# Patient Record
Sex: FEMALE | Race: WHITE | Employment: FULL TIME | URBAN - METROPOLITAN AREA
[De-identification: names, ages, dates, MRNs, and addresses within clinical notes are randomized per-mention and may not be internally consistent; named-entity substitution may affect disease eponyms.]

---

## 2017-04-18 ENCOUNTER — OFFICE VISIT (OUTPATIENT)
Dept: ORTHOPEDIC SURGERY | Age: 52
End: 2017-04-18

## 2017-04-18 VITALS — WEIGHT: 145.94 LBS | BODY MASS INDEX: 25.86 KG/M2 | HEIGHT: 63 IN

## 2017-04-18 DIAGNOSIS — M17.12 PRIMARY OSTEOARTHRITIS OF LEFT KNEE: Primary | ICD-10-CM

## 2017-04-18 PROCEDURE — 20611 DRAIN/INJ JOINT/BURSA W/US: CPT | Performed by: PHYSICIAN ASSISTANT

## 2017-04-18 PROCEDURE — 99213 OFFICE O/P EST LOW 20 MIN: CPT | Performed by: PHYSICIAN ASSISTANT

## 2017-04-19 ENCOUNTER — TELEPHONE (OUTPATIENT)
Dept: ORTHOPEDIC SURGERY | Age: 52
End: 2017-04-19

## 2017-04-24 ENCOUNTER — HOSPITAL ENCOUNTER (OUTPATIENT)
Dept: SURGERY | Age: 52
Discharge: HOME | End: 2017-04-24
Attending: INTERNAL MEDICINE | Admitting: INTERNAL MEDICINE

## 2017-04-24 VITALS
HEIGHT: 63 IN | SYSTOLIC BLOOD PRESSURE: 116 MMHG | BODY MASS INDEX: 28 KG/M2 | TEMPERATURE: 97.7 F | DIASTOLIC BLOOD PRESSURE: 76 MMHG | OXYGEN SATURATION: 100 % | WEIGHT: 158 LBS | HEART RATE: 62 BPM | RESPIRATION RATE: 16 BRPM

## 2017-04-24 LAB
HBV SURFACE AB TITR SER: <3.5 MUL/ML
HEPATITIS B SURFACE ANTIGEN INTERPRETATION: NORMAL
HEPATITIS C ANTIBODY INTERPRETATION: NORMAL

## 2017-04-24 RX ORDER — ASCORBIC ACID 500 MG
500 TABLET ORAL DAILY
COMMUNITY
End: 2019-08-20

## 2017-04-24 RX ORDER — LIDOCAINE HYDROCHLORIDE 10 MG/ML
0.1 INJECTION, SOLUTION EPIDURAL; INFILTRATION; INTRACAUDAL; PERINEURAL ONCE
Status: DISCONTINUED | OUTPATIENT
Start: 2017-04-24 | End: 2017-04-25 | Stop reason: HOSPADM

## 2017-04-24 RX ORDER — MAGNESIUM 30 MG
250 TABLET ORAL DAILY
COMMUNITY

## 2017-04-24 RX ORDER — NICOTINE POLACRILEX 2 MG
10000 GUM BUCCAL DAILY
COMMUNITY
End: 2019-09-03

## 2017-04-24 RX ORDER — ESTRADIOL 0.5 MG/1
0.5 TABLET ORAL DAILY
COMMUNITY
End: 2019-08-20

## 2017-04-24 RX ORDER — SODIUM CHLORIDE, SODIUM LACTATE, POTASSIUM CHLORIDE, CALCIUM CHLORIDE 600; 310; 30; 20 MG/100ML; MG/100ML; MG/100ML; MG/100ML
INJECTION, SOLUTION INTRAVENOUS CONTINUOUS
Status: DISCONTINUED | OUTPATIENT
Start: 2017-04-24 | End: 2017-04-25 | Stop reason: HOSPADM

## 2017-04-24 RX ADMIN — SODIUM CHLORIDE, SODIUM LACTATE, POTASSIUM CHLORIDE, CALCIUM CHLORIDE: 600; 310; 30; 20 INJECTION, SOLUTION INTRAVENOUS at 07:22

## 2017-04-24 ASSESSMENT — PAIN - FUNCTIONAL ASSESSMENT: PAIN_FUNCTIONAL_ASSESSMENT: 0-10

## 2017-04-24 ASSESSMENT — PAIN SCALES - GENERAL
PAINLEVEL_OUTOF10: 0

## 2017-04-25 LAB — HEPATITIS B CORE TOTAL ANTIBODY: NEGATIVE

## 2017-04-27 LAB
C282Y HEMOCHROMATOSIS MUT: NEGATIVE
H63D HEMOCHROMATOSIS MUT: NEGATIVE
HEMOCHROMATOSIS GENE ANALYSIS: NORMAL
HFE PCR SPECIMEN: NORMAL
S65C HEMOCHROMATOSIS MUT: NEGATIVE

## 2017-09-21 ENCOUNTER — TELEPHONE (OUTPATIENT)
Dept: ORTHOPEDIC SURGERY | Age: 52
End: 2017-09-21

## 2017-09-21 ENCOUNTER — HOSPITAL ENCOUNTER (OUTPATIENT)
Dept: MAMMOGRAPHY | Age: 52
Discharge: HOME | End: 2017-09-21
Attending: OBSTETRICS & GYNECOLOGY | Admitting: OBSTETRICS & GYNECOLOGY

## 2017-09-21 DIAGNOSIS — Z12.31 ENCOUNTER FOR SCREENING MAMMOGRAM FOR BREAST CANCER: ICD-10-CM

## 2018-09-24 ENCOUNTER — HOSPITAL ENCOUNTER (OUTPATIENT)
Dept: WOMENS IMAGING | Age: 53
Discharge: HOME | End: 2018-09-24
Payer: COMMERCIAL

## 2018-09-24 DIAGNOSIS — Z12.31 ENCOUNTER FOR SCREENING MAMMOGRAM FOR MALIGNANT NEOPLASM OF BREAST: ICD-10-CM

## 2018-09-24 PROCEDURE — 77067 SCR MAMMO BI INCL CAD: CPT

## 2018-10-02 ENCOUNTER — HOSPITAL ENCOUNTER (OUTPATIENT)
Dept: WOMENS IMAGING | Age: 53
Discharge: HOME | End: 2018-10-02
Payer: COMMERCIAL

## 2018-10-02 DIAGNOSIS — R92.8 ABNORMAL MAMMOGRAM: ICD-10-CM

## 2018-10-02 PROCEDURE — 76642 ULTRASOUND BREAST LIMITED: CPT

## 2018-10-02 PROCEDURE — G0279 TOMOSYNTHESIS, MAMMO: HCPCS

## 2019-04-04 ENCOUNTER — HOSPITAL ENCOUNTER (OUTPATIENT)
Dept: WOMENS IMAGING | Age: 54
Discharge: HOME | End: 2019-04-04
Payer: COMMERCIAL

## 2019-04-04 DIAGNOSIS — R92.8 ABNORMAL MAMMOGRAM: ICD-10-CM

## 2019-04-04 PROCEDURE — G0279 TOMOSYNTHESIS, MAMMO: HCPCS

## 2019-04-04 PROCEDURE — 76642 ULTRASOUND BREAST LIMITED: CPT

## 2019-07-26 ENCOUNTER — OFFICE VISIT (OUTPATIENT)
Dept: ORTHOPEDIC SURGERY | Age: 54
End: 2019-07-26
Payer: COMMERCIAL

## 2019-07-26 VITALS
BODY MASS INDEX: 31.89 KG/M2 | DIASTOLIC BLOOD PRESSURE: 77 MMHG | HEART RATE: 72 BPM | SYSTOLIC BLOOD PRESSURE: 111 MMHG | WEIGHT: 180 LBS | HEIGHT: 63 IN

## 2019-07-26 DIAGNOSIS — M54.2 NECK PAIN: Primary | ICD-10-CM

## 2019-07-26 PROCEDURE — 96372 THER/PROPH/DIAG INJ SC/IM: CPT | Performed by: PHYSICAL MEDICINE & REHABILITATION

## 2019-07-26 PROCEDURE — 99203 OFFICE O/P NEW LOW 30 MIN: CPT | Performed by: PHYSICAL MEDICINE & REHABILITATION

## 2019-07-26 RX ORDER — PREDNISONE 10 MG/1
TABLET ORAL
Qty: 26 TABLET | Refills: 0 | Status: SHIPPED | OUTPATIENT
Start: 2019-07-26 | End: 2019-08-20

## 2019-07-26 RX ORDER — KETOROLAC TROMETHAMINE 10 MG/1
10 TABLET, FILM COATED ORAL EVERY 6 HOURS PRN
Qty: 20 TABLET | Refills: 0 | Status: SHIPPED | OUTPATIENT
Start: 2019-07-26 | End: 2019-08-20

## 2019-08-19 PROBLEM — F41.9 ANXIETY: Status: ACTIVE | Noted: 2018-11-12

## 2019-08-19 PROBLEM — M19.041 ARTHRITIS OF FINGER OF BOTH HANDS: Status: ACTIVE | Noted: 2018-04-16

## 2019-08-19 PROBLEM — K57.30 COLON, DIVERTICULOSIS: Status: ACTIVE | Noted: 2018-02-24

## 2019-08-19 PROBLEM — I10 ESSENTIAL HYPERTENSION: Status: ACTIVE | Noted: 2017-12-05

## 2019-08-19 PROBLEM — M19.042 ARTHRITIS OF FINGER OF BOTH HANDS: Status: ACTIVE | Noted: 2018-04-16

## 2019-08-19 PROBLEM — E04.1 THYROID NODULE: Status: ACTIVE | Noted: 2017-01-30

## 2019-08-20 ENCOUNTER — OFFICE VISIT (OUTPATIENT)
Dept: ENDOCRINOLOGY | Age: 54
End: 2019-08-20
Payer: COMMERCIAL

## 2019-08-20 VITALS
DIASTOLIC BLOOD PRESSURE: 79 MMHG | OXYGEN SATURATION: 98 % | WEIGHT: 186 LBS | HEART RATE: 80 BPM | SYSTOLIC BLOOD PRESSURE: 133 MMHG | BODY MASS INDEX: 32.96 KG/M2 | HEIGHT: 63 IN

## 2019-08-20 DIAGNOSIS — Z83.3 FAMILY HISTORY OF DIABETES MELLITUS: ICD-10-CM

## 2019-08-20 DIAGNOSIS — E16.2 HYPOGLYCEMIA: ICD-10-CM

## 2019-08-20 DIAGNOSIS — E66.09 NON MORBID OBESITY DUE TO EXCESS CALORIES: ICD-10-CM

## 2019-08-20 DIAGNOSIS — R68.89 EXCESSIVE BIOTIN INTAKE: ICD-10-CM

## 2019-08-20 PROBLEM — E67.8 EXCESSIVE BIOTIN INTAKE: Status: ACTIVE | Noted: 2019-08-20

## 2019-08-20 PROCEDURE — 99244 OFF/OP CNSLTJ NEW/EST MOD 40: CPT | Performed by: INTERNAL MEDICINE

## 2019-08-20 RX ORDER — GINSENG 100 MG
CAPSULE ORAL
COMMUNITY

## 2019-08-20 RX ORDER — LISINOPRIL 5 MG/1
TABLET ORAL DAILY
COMMUNITY

## 2019-08-20 RX ORDER — ESTRADIOL 2 MG/1
2 TABLET ORAL DAILY
COMMUNITY

## 2019-08-20 RX ORDER — DULOXETIN HYDROCHLORIDE 30 MG/1
CAPSULE, DELAYED RELEASE ORAL
COMMUNITY
Start: 2019-05-09

## 2019-08-22 DIAGNOSIS — E16.2 HYPOGLYCEMIA: ICD-10-CM

## 2019-08-22 DIAGNOSIS — R68.89 EXCESSIVE BIOTIN INTAKE: ICD-10-CM

## 2019-08-22 LAB
GLUCOSE BLD-MCNC: 71 MG/DL (ref 70–99)
T3 FREE: 2.4 PG/ML (ref 2.3–4.2)
T4 FREE: 1.2 NG/DL (ref 0.9–1.8)

## 2019-08-24 LAB — C-PEPTIDE: 1.4 NG/ML (ref 1.1–4.4)

## 2019-09-03 ENCOUNTER — OFFICE VISIT (OUTPATIENT)
Dept: ENDOCRINOLOGY | Age: 54
End: 2019-09-03
Payer: COMMERCIAL

## 2019-09-03 VITALS
HEART RATE: 78 BPM | OXYGEN SATURATION: 96 % | HEIGHT: 63 IN | WEIGHT: 189 LBS | SYSTOLIC BLOOD PRESSURE: 105 MMHG | BODY MASS INDEX: 33.49 KG/M2 | DIASTOLIC BLOOD PRESSURE: 72 MMHG

## 2019-09-03 DIAGNOSIS — E16.2 HYPOGLYCEMIA: Primary | ICD-10-CM

## 2019-09-03 PROCEDURE — 99212 OFFICE O/P EST SF 10 MIN: CPT | Performed by: INTERNAL MEDICINE

## 2019-09-03 PROCEDURE — 97802 MEDICAL NUTRITION INDIV IN: CPT | Performed by: DIETITIAN, REGISTERED

## 2019-09-03 PROCEDURE — 95251 CONT GLUC MNTR ANALYSIS I&R: CPT | Performed by: INTERNAL MEDICINE

## 2019-10-14 ENCOUNTER — HOSPITAL ENCOUNTER (OUTPATIENT)
Dept: MAMMOGRAPHY | Age: 54
Discharge: HOME | End: 2019-10-14
Payer: COMMERCIAL

## 2019-10-14 DIAGNOSIS — Z12.39 BREAST CANCER SCREENING: ICD-10-CM

## 2019-10-14 PROCEDURE — 77063 BREAST TOMOSYNTHESIS BI: CPT

## 2020-10-15 ENCOUNTER — HOSPITAL ENCOUNTER (OUTPATIENT)
Dept: MAMMOGRAPHY | Age: 55
Discharge: HOME | End: 2020-10-15
Payer: COMMERCIAL

## 2020-10-15 PROCEDURE — 77063 BREAST TOMOSYNTHESIS BI: CPT

## 2020-10-23 ENCOUNTER — HOSPITAL ENCOUNTER (OUTPATIENT)
Dept: WOMENS IMAGING | Age: 55
Discharge: HOME | End: 2020-10-23
Payer: COMMERCIAL

## 2020-10-23 PROCEDURE — G0279 TOMOSYNTHESIS, MAMMO: HCPCS

## 2020-10-23 PROCEDURE — 76642 ULTRASOUND BREAST LIMITED: CPT

## 2021-03-17 ENCOUNTER — IMMUNIZATION (OUTPATIENT)
Dept: PRIMARY CARE CLINIC | Age: 56
End: 2021-03-17
Payer: COMMERCIAL

## 2021-03-17 PROCEDURE — 0011A COVID-19, MODERNA VACCINE 100MCG/0.5ML DOSE: CPT | Performed by: FAMILY MEDICINE

## 2021-03-17 PROCEDURE — 91301 COVID-19, MODERNA VACCINE 100MCG/0.5ML DOSE: CPT | Performed by: FAMILY MEDICINE

## 2021-04-14 ENCOUNTER — IMMUNIZATION (OUTPATIENT)
Dept: PRIMARY CARE CLINIC | Age: 56
End: 2021-04-14
Payer: COMMERCIAL

## 2021-04-14 PROCEDURE — 91301 COVID-19, MODERNA VACCINE 100MCG/0.5ML DOSE: CPT | Performed by: FAMILY MEDICINE

## 2021-04-14 PROCEDURE — 0012A COVID-19, MODERNA VACCINE 100MCG/0.5ML DOSE: CPT | Performed by: FAMILY MEDICINE

## 2021-10-06 NOTE — PROGRESS NOTES
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    initially treated w phlebotomy then with anti-malarials    Reflux     Thyroid nodule          Past Surgical History:   Procedure Laterality Date    CARPAL TUNNEL RELEASE      right    CERVICAL FUSION      C6-C7    COLONOSCOPY  04/24/2017    polyp, diverticulosis    CYST REMOVAL      right wrist    FINE NEEDLE ASPIRATION  02/16/2015    Thyroid nodule    HYSTERECTOMY  2001    partial    KNEE ARTHROSCOPY Left 3/15/16    partial medial menisectomy    KNEE SURGERY      right 4/2011 X 2    OVARY REMOVAL      bilateral    SHOULDER ARTHROSCOPY Left 10/27/15    SHOULDER SURGERY      right         Allergies   Allergen Reactions    Greene-2 Inhibitors Shortness Of Breath     Wheezing/SOB    Cephalosporins Hives     Unknown reaction    Other      Greene II inhibitors cause shortness of breath         Current Outpatient Medications:     estradiol (ESTRACE) 2 MG tablet, Take 2 mg by mouth daily, Disp: , Rfl:     lisinopril (PRINIVIL;ZESTRIL) 5 MG tablet, Take by mouth daily, Disp: , Rfl:     CHOLECALCIFEROL PO, Take 50 mcg by mouth daily, Disp: , Rfl:     DULoxetine (CYMBALTA) 30 MG extended release capsule, TAKE 1 CAPSULE EVERY DAY, Disp: , Rfl:     Coenzyme Q10 (CO Q10) 30 MG CAPS, Take by mouth, Disp: , Rfl:     Famotidine (PEPCID PO), Take by mouth, Disp: , Rfl:     Biotin 1 MG CAPS, Take 10,000 mcg by mouth daily , Disp: , Rfl:     magnesium 30 MG tablet, Take 250 mg by mouth daily , Disp: , Rfl:     ibuprofen (ADVIL;MOTRIN) 600 MG tablet, Take 1 tablet by mouth every 6 hours as needed for Pain, Disp: 40 tablet, Rfl: 1    Omega-3 Fatty Acids (FISH OIL PO), Take 1 tablet by mouth nightly, Disp: , Rfl:     hydroxychloroquine (PLAQUENIL) 200 MG tablet, Take 200 mg by mouth Twice a Week , Disp: , Rfl:     cetirizine (ZYRTEC ALLERGY) 10 MG tablet, Take 10 mg by mouth daily, Disp: , Rfl:     diphenhydrAMINE (BENADRYL) 25
no

## 2021-10-26 ENCOUNTER — HOSPITAL ENCOUNTER (OUTPATIENT)
Dept: WOMENS IMAGING | Age: 56
Discharge: HOME | End: 2021-10-26
Payer: COMMERCIAL

## 2021-10-26 DIAGNOSIS — Z12.31 SCREENING MAMMOGRAM, ENCOUNTER FOR: ICD-10-CM

## 2021-10-26 PROCEDURE — 77063 BREAST TOMOSYNTHESIS BI: CPT

## 2022-10-26 ENCOUNTER — HOSPITAL ENCOUNTER (OUTPATIENT)
Dept: WOMENS IMAGING | Age: 57
Discharge: HOME | End: 2022-10-26
Payer: COMMERCIAL

## 2022-10-26 DIAGNOSIS — Z12.31 ENCOUNTER FOR SCREENING MAMMOGRAM FOR BREAST CANCER: ICD-10-CM

## 2022-10-26 PROCEDURE — 77067 SCR MAMMO BI INCL CAD: CPT

## 2022-10-31 ENCOUNTER — HOSPITAL ENCOUNTER (OUTPATIENT)
Dept: WOMENS IMAGING | Age: 57
Discharge: HOME | End: 2022-10-31
Payer: COMMERCIAL

## 2022-10-31 DIAGNOSIS — R92.8 ABNORMAL MAMMOGRAM: ICD-10-CM

## 2022-10-31 PROCEDURE — 77065 DX MAMMO INCL CAD UNI: CPT

## 2022-10-31 NOTE — PROGRESS NOTES
Last office visit:  3/2022 with Dr Louie  Upcoming Office Visit.  9/2022 with new PCP  Last lab work.  3/2022     [address]  [address] [place] [address], [address] [place] [address] [address]   Phone: [phone]          NAME:  Samantha Louise  YOB: 1965  MEDICAL RECORD NUMBER:  [id number]  TODAY'S DATE:  10/31/2022      Referring Physician: Dr. Harrison Segovia    Procedure: Stereotactic Bx    Right breast, two sites    Date of biopsy: 11/4/22    Patient taking blood thinners: no    Medicine allergies: yes - see chart    Special Instructions: prepped prone. Reviewed pre and post biopsy instructions/information with patient. Pt verbalized understanding. Biopsy order form faxed to referring MD.      Stereotactic Biopsy Education     What is it? Stereotactic breast biopsy is a test that uses imaging, such as  X-ray, to find an area of your breast where a tissue sample will be taken. The sample is viewed  under a microscope to check for signs of breast cancer. Why is this test done? This type of breast biopsy is usually done to check for cancer in a lump or microcalcifications found during a mammogram.     How can you prepare for the test?    You may take your regular medications as prescribed. You may eat and drink before the test.  Take a shower the evening or morning before the biopsy. What happens before the test?   Mammogram images are taken to find the exact site for the biopsy. Your skin is washed with an alcohol prep. You will be given an injection of medication to numb your breast.     What happens during the test?  When your breast is numb, a small cut is made in the skin. Using the imaging, the doctor will guide the needle into the biopsy area. A sample of breast tissue is taken through the needle. A small clip is inserted into your breast to stephenie the biopsy site. The needle is removed and pressure put on the needle site to stop any bleeding. Steri Strips and a bandage will be placed over the site. How long does the test take?  About 60 minutes. Most of the time is spent preparing for the images and finding the area for the biopsy. What are the risks? Bleeding: You may have some bleeding which can cause bruising, swelling, or hematoma. Infection: Signs of infection are redness, swelling, heat, or increasing pain at site. Sample is not adequate: This would require repeating the biopsy. What happens after the test?  The nurse will review your post biopsy instructions which include:        Placing an ice pack in your bra. Wearing a firm fitting bra. You may use Tylenol (Acetaminiphen) for discomfort. Lab results take about 2-3 business days. The Breast Navigator or your doctor will call you with the results. Pre Stereotactic Breast Biopsy:     (Please arrive 15 minutes early)                                                 [x] Blood thinner history reviewed with patient    [x] Take all your other medications on your normal routine schedule. [x] You may eat and drink as normal before your biopsy. [x] You may drive yourself. [x] Take a shower the night before or the morning of the biopsy. [x] No heavy lifting or exercise for 48 hours after the biopsy. [x] Printed Pre Stereotactic Biopsy Instructions were provided, reviewed with the patient and all questions were answered. Gonzalo Post RN  10/31/2022  9:02 AM      The [facility] Information:   Should you experience any significant changes in your health or have questions about your care, please contact:  Gonzalo Post, Breast Nurse Navigator with [facility] [place]  [phone]  Monday-Friday. If you need help with your care outside these hours and cannot wait until we are again available, contact your Physician or go to the hospital emergency room.

## 2022-11-04 ENCOUNTER — HOSPITAL ENCOUNTER (OUTPATIENT)
Dept: WOMENS IMAGING | Age: 57
Discharge: HOME | End: 2022-11-04
Payer: COMMERCIAL

## 2022-11-04 DIAGNOSIS — R92.8 ABNORMAL MAMMOGRAM: ICD-10-CM

## 2022-11-04 PROCEDURE — 19082 BX BREAST ADD LESION STRTCTC: CPT

## 2022-11-04 PROCEDURE — 88342 IMHCHEM/IMCYTCHM 1ST ANTB: CPT

## 2022-11-04 PROCEDURE — 76098 X-RAY EXAM SURGICAL SPECIMEN: CPT

## 2022-11-04 PROCEDURE — 2720000010 MAM STEREO BREAST BX W LOC DEVICE 1ST LESION RIGHT

## 2022-11-04 PROCEDURE — 77065 DX MAMMO INCL CAD UNI: CPT

## 2022-11-04 PROCEDURE — 88305 TISSUE EXAM BY PATHOLOGIST: CPT

## 2022-11-04 NOTE — PROGRESS NOTES
Patient in [address] [place] [address] for breast biopsy. Radiologist reviewed procedure with patient, consent signed. Patient tolerated procedure well. Compression held. Site cleansed with chloraprep, steri strips and dry dressing applied. Ice pack provided. Reviewed discharge instructions with patient. Patient verbalized understanding and agreed to contact the Breast Navigator with any questions. Patient was A&Ox3 and steady on feet and discharged to waiting area. The [address]   Discharge Instructions  [facility]  [address]  [address]  Telephone: [phone]    NAME:  Chapin Bartlett  YOB: 1965  GENDER: female  MEDICAL RECORD NUMBER:  [id number]  TODAY'S DATE:  11/4/2022    Discharge Instructions Breast Center:    Post Breast Biopsy Instructions     [x] You may remove your outer dressing in 24 hours and you may shower. \"Steri-strips\" tape will stay on for 5-7 days. [x] Place a cold pack inside your bra on top of the dressing for at least 3 hours, removing it every 15 minutes for 15 minutes after your biopsy. [x] Wear a firm fitting bra for at least 24 hours after your biopsy, including while you sleep. [x] Place an ice pack inside your bra on top of the dressing for at least 3 hours, removing it every 15 minutes for 15 minutes after your biopsy. [x] You may resume your held medications tomorrow, unless otherwise directed by your physician. [x] Your physician has instructed you to take Tylenol (Acetaminophen) the day of your biopsy for any discomfort. [x] Watch for excessive bleeding. If bleeding occurs apply pressure to site. Watch for signs of infection, increased pain, redness, swelling and heat. If this occurs call your physician. [x] Do not participate in any strenuous exercise for 48 hours after your biopsy and do not lift, pull or push anything over 5-10 lbs.       [x] Results will be available in 2-3 working days.  Your referring physician or the Nurse Navigator will call you with results. The [facility] Information:   Should you experience any significant changes in your health or have questions about your care, please contact:  Jean-Paul Sy Nurse Breast Navigator with [facility]  [phone]  Monday-Friday. If you need help with your care outside these hours and cannot wait until we are again available, contact your Physician or go to the hospital emergency room.         Electronically signed by Jean-Paul Sy RN on 11/4/2022 at 1:49 PM

## 2022-11-08 ENCOUNTER — TELEPHONE (OUTPATIENT)
Dept: WOMENS IMAGING | Age: 57
End: 2022-11-08

## 2022-11-08 NOTE — TELEPHONE ENCOUNTER
Pathology for breast biopsy complete, radiologist confirms concordance. Reviewed breast biopsy results with patient and answered all questions. The radiologist is recommending that the patient see a breast surgeon regarding biopsy results. Per patient request, referral made to [facility]. Path report faxed to Dr. Laurel Barthel. Breast Navigator will remain available for any questions. Pt verbalized understanding.       Cale Mac RN

## 2022-11-14 ENCOUNTER — TELEPHONE (OUTPATIENT)
Dept: BREAST CENTER | Age: 57
End: 2022-11-14

## 2022-11-14 NOTE — TELEPHONE ENCOUNTER
Patient called & asked if there was anyway to speak withTammi please, said she needed to either speak to her or have a call returned? She is needing estimated times of when she thinks her surgery might be so she can let work know. Jessica Rivas has taken car of this.

## 2022-11-15 NOTE — TELEPHONE ENCOUNTER
I spoke to patient. I'm unable to give her date until she meets with . I did give her an estimate of dates.        Thanks, Ananda Younger

## 2022-11-21 ASSESSMENT — ENCOUNTER SYMPTOMS
GASTROINTESTINAL NEGATIVE: 1
RESPIRATORY NEGATIVE: 1
EYES NEGATIVE: 1

## 2022-11-21 NOTE — H&P (VIEW-ONLY)
CC: Right breast atypical ductal hyperplasia    HPI: Ms. Jarrett Zheng is a 65 y.o. woman who presents today with new atypical ductal hyperplasia of the right breast. She has not noticed any new abnormalities such as masses, skin changes, color changes,nipple discharge, or changes to the nipple-areolar complex. She has no family history of breast or ovarian cancer. She presents to the surgical office today in initial consultation to discuss her recent breast FF radiology. INTERVAL HISTORY:  On 10/31/2022 she was recalled from screening mammogram for right breast calcifications. The left breast was negative. Diagnostic imaging identified 2 subcentimeter groups of calcifications in the 9:00 and 12 o'clock position. Both recommended for biopsy. BI-RADS 4. On 2022 she underwent 3 site core needle biopsy of the right breast.  Pathology was considered concordant. Pathology of the right breast, 9 o'clock position identified atypical ductal hyperplasia and PASH. There is no sign of malignancy. An open coil marker was placed. This is located approximately 7 mm inferior to the calcifications that remain. The following 2 biopsies were taken from the same location and marked by a butterfly clip. Pathology of the right breast 12:00 calcifications identified benign findings with no sign of malignancy. Pathology of the right breast 12:00 identified atypical ductal hyperplasia with no sign of malignancy. PATHOLOGY:    Department of Pathology   FINAL SURGICAL PATHOLOGY REPORT   Patient Name:  Lashawn Paul          Accession No:  [id number]    Age Sex:   1965   65 Y / F       Location:      [facility]   Account No:    [de-identified]                 Collected:     2022   Med Rec No:    [id number]                Received:      2022   Attend Phys:   Jairon Hernandez MD        Completed:     2022   Perform Phys:  Christine Katz MD             FINAL DIAGNOSIS:     A.   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 - Negative for malignancy. B.  Right breast calcifications at 12 o'clock, biopsies:   - Benign breast tissue with columnar cell hyperplasia and calcifications. - Negative for malignancy. C.  Right breast at 12 o'clock, biopsies:   - Atypical ductal hyperplasia, columnar cell hyperplasia, usual ductal   hyperplasia, increase stromal fibrosis, and calcifications. - Negative for malignancy. BUCCA/BUCCA         Preoperative Diagnosis: A) Biopsy, right breast calcifications at 9   o'clock B) Biopsy, right breast calcifications at 12 o'clock C) Biopsy,   right breast at 12 o'clock   Postoperative Diagnosis: A) Biopsy, right breast calcifications at 9   o'clock B) Biopsy, right breast calcifications at 12 o'clock C) Biopsy,   right breast at 12 o'clock       SPECIMEN:   A.  BREAST, BIOPSY, RIGHT, FORMALIN TIME 1300   B. BREAST, BIOPSY, RIGHT, FORMALIN TIME 1326   C. BREAST, BIOPSY, RIGHT, FORMALIN TIME 1340     GROSS DESCRIPTION:   A: Received in formalin on a radiology grid labeled with the patient's   name and \"right breast calcifications at 9 o'clock\" are sanderson yellow   fragments of adipose tissue and breast tissue, which range in size from 2   to 8 mm. The specimen is entirely submitted in four cassettes, labeled A1   - A4. The specimen was placed in formalin at 1300 on 11/4/2022 (greater   than 6 and less than 72 hours of formalin fixation prior to tissue   processing). The cold ischemic time was 10 minutes. B: Received in formalin on a radiology grid labeled with the patient's   name and \"right breast calcifications at 12 o'clock\" are fragments of   sanderson yellow adipose tissue and breast tissue, which range in size from   1 to 3 mm.  The specimen is entirely submitted in three cassettes, labeled   B1 - B3. The specimen was placed in formalin at 1326 on 11/4/2022   (greater than 6 and less than 72 hours of formalin fixation prior to   tissue processing). The cold ischemic time was 4 minutes. C: Received in formalin in a container labeled with the patient's name   and \"right breast at 12 o'clock\" are fragments of sanderson yellow adipose   tissue and breast tissue, which range in size from 2 mm to 8 mm. The   specimen is entirely submitted in five cassettes, labeled C1 - C5. The   specimen was placed in formalin at 1340 (greater than 6 and less than 72   hours of formalin fixation prior to tissue processing). The cold   ischemic time was 5 minutes. BUCCA/BUCCA     MICROSCOPIC DESCRIPTION: Microscopic examination performed. An   immunoperoxidase stain for CK5/6 was performed on specimens A and C for   the purpose of ruling out atypia. The stain is absent in foci of   atypical epithelium, consistent with atypical ductal hyperplasia. The immunohistochemical (IHC) stain was performed using a single antibody   on a separate tissue section. No multiple antibody cocktails were used   unless specifically documented. An appropriate positive control was   performed with the antibody and the results were reviewed. The control   stain showed the expected positive result within technically acceptable   parameters. CPT: 59142 X3   34790 X2   Copy to: Josie Serrano MD   Case signed out at [facility], [facility] ,   [place], [facility]   Technical processing at [address] [address]  Phone [phone]         Emily Mcmullen M.D., M.S.   (Electronic Signature)   11/08/2022         Review of Systems   Constitutional: Negative. HENT: Negative. Eyes: Negative. Respiratory: Negative. Cardiovascular: Negative. Gastrointestinal: Negative. Genitourinary: Negative. history. Hormonal History:   a.0  m.0  Menarche at age 16. First live birth at age 17. did breastfeed. Postmenopausal at age 40  Hysterectomy: at age 40  Denies estrogen supplementation currently but used estradiol patch until age 49  OCP taken in the past but not currently    Medications:  Medication documentation has been reviewed in the electronic medical record and patient office intake form. Exam:  There were no vitals taken for this visit. Physical Exam      Constitutional: She appears well-nourished. No apparent distress. Breast: The patient was examined in the upright and supine position. She has a \"D\" cup breast. Breasts are symmetrically ptotic. Right: There were no discrete masses or changes in breast contour. There were no skin changes of the breast or nipple areolar complex. There was no nipple inversion or discharge. Left: There were no discrete masses or changes in breast contour. There were no skin changes of the breast or nipple areolar complex. There was no nipple inversion or discharge. There is no axillary lymphadenopathy palpated bilaterally. Head: Normocephalic andatraumatic. Eyes: EOM are normal. Pupils are equal, round, and reactive to light. Neck: Neck supple. No tracheal deviation present. Cardiovascular: regular rate. Extremities appear well perfused. Pulmonary: respirationsare non-labored and without audible distress  Lymphatics: no palpable axillary or cervical lymphadenopathy. Skin: No rash noted. No erythema. Neurologic: alert and oriented. Assessment/Plan: Ms. Erna Gauthier is a 65y.o. year-old woman with right breast atypical ductal hyperplasia      I reviewed with Ms. Erna Gauthier her most recent breast imaging, physical exam findings and biopsy results. We discussed the pathophysiology of her biopsy. We discussed the relative risk of a future cancer in comparison to the general population.   The upgrade rates very better generally excepted to be >20% (ASBrS, 2016). Due to this upgrade risk of invasive cancer or noninvasive cancer in the area surrounding her biopsy cavity, we discussed excisional breast biopsy in the operating room. We discussed the technique of localization. This will be done by targeting the lesion, near which the clip was placed post biopsy. This is performed using either mammographic or ultrasound guidance. We would then proceed to the operating room to remove the abnormal area within the breast. We discussed the risks and benefits of surgery which include but are not limited to bleeding, infection, wound complications, unappealing cosmetics, and the need to return to the operating room for a second procedure based on final pathology. We discussed assessing her breast cancer risk. We performed a risk assessment to evaluate her risk compared to the general population. Her lifetime risk for breast cancer is 46.5% (general population average 10%). If she ultimately is not upgraded we will discuss management based on her personal risk. At this time: We will plan for 2 site right excisional breast biopsy of the 9 to 12 o'clock position marked by the open coil and butterfly marker. All of the patient's questions were answered at this time however, she was encouraged to call the office with any further inquiries. Approximately 30 minutes of time were spent in preparation, direct patient contact, record review, imaging interpretation, care coordination, documentation and activities otherwise related to this encounter.

## 2022-11-21 NOTE — PROGRESS NOTES
CC: Right breast atypical ductal hyperplasia    HPI: Ms. Ashley Kinney is a 65 y.o. woman who presents today with new atypical ductal hyperplasia of the right breast. She has not noticed any new abnormalities such as masses, skin changes, color changes,nipple discharge, or changes to the nipple-areolar complex. She has no family history of breast or ovarian cancer. She presents to the surgical office today in initial consultation to discuss her recent breast FF radiology. INTERVAL HISTORY:  On 10/31/2022 she was recalled from screening mammogram for right breast calcifications. The left breast was negative. Diagnostic imaging identified 2 subcentimeter groups of calcifications in the 9:00 and 12 o'clock position. Both recommended for biopsy. BI-RADS 4. On 2022 she underwent 3 site core needle biopsy of the right breast.  Pathology was considered concordant. Pathology of the right breast, 9 o'clock position identified atypical ductal hyperplasia and PASH. There is no sign of malignancy. An open coil marker was placed. This is located approximately 7 mm inferior to the calcifications that remain. The following 2 biopsies were taken from the same location and marked by a butterfly clip. Pathology of the right breast 12:00 calcifications identified benign findings with no sign of malignancy. Pathology of the right breast 12:00 identified atypical ductal hyperplasia with no sign of malignancy. PATHOLOGY:    Department of Pathology   FINAL SURGICAL PATHOLOGY REPORT   Patient Name:  Sravanthi Landry          Accession No:  [id number]    Age Sex:   1965   65 Y / F       Location:      [facility]   Account No:    [de-identified]                 Collected:     2022   Med Rec No:    [id number]                Received:      2022   Attend Phys:   Bucky Quinn MD        Completed:     2022   Perform Phys:  Brian Osuna MD             FINAL DIAGNOSIS:     A.   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 - Negative for malignancy. B.  Right breast calcifications at 12 o'clock, biopsies:   - Benign breast tissue with columnar cell hyperplasia and calcifications. - Negative for malignancy. C.  Right breast at 12 o'clock, biopsies:   - Atypical ductal hyperplasia, columnar cell hyperplasia, usual ductal   hyperplasia, increase stromal fibrosis, and calcifications. - Negative for malignancy. BUCCA/BUCCA         Preoperative Diagnosis: A) Biopsy, right breast calcifications at 9   o'clock B) Biopsy, right breast calcifications at 12 o'clock C) Biopsy,   right breast at 12 o'clock   Postoperative Diagnosis: A) Biopsy, right breast calcifications at 9   o'clock B) Biopsy, right breast calcifications at 12 o'clock C) Biopsy,   right breast at 12 o'clock       SPECIMEN:   A.  BREAST, BIOPSY, RIGHT, FORMALIN TIME 1300   B. BREAST, BIOPSY, RIGHT, FORMALIN TIME 1326   C. BREAST, BIOPSY, RIGHT, FORMALIN TIME 1340     GROSS DESCRIPTION:   A: Received in formalin on a radiology grid labeled with the patient's   name and \"right breast calcifications at 9 o'clock\" are sanderson yellow   fragments of adipose tissue and breast tissue, which range in size from 2   to 8 mm. The specimen is entirely submitted in four cassettes, labeled A1   - A4. The specimen was placed in formalin at 1300 on 11/4/2022 (greater   than 6 and less than 72 hours of formalin fixation prior to tissue   processing). The cold ischemic time was 10 minutes. B: Received in formalin on a radiology grid labeled with the patient's   name and \"right breast calcifications at 12 o'clock\" are fragments of   sanderson yellow adipose tissue and breast tissue, which range in size from   1 to 3 mm.  The specimen is entirely submitted in three cassettes, labeled   B1 - B3. The specimen was placed in formalin at 1326 on 11/4/2022   (greater than 6 and less than 72 hours of formalin fixation prior to   tissue processing). The cold ischemic time was 4 minutes. C: Received in formalin in a container labeled with the patient's name   and \"right breast at 12 o'clock\" are fragments of sanderson yellow adipose   tissue and breast tissue, which range in size from 2 mm to 8 mm. The   specimen is entirely submitted in five cassettes, labeled C1 - C5. The   specimen was placed in formalin at 1340 (greater than 6 and less than 72   hours of formalin fixation prior to tissue processing). The cold   ischemic time was 5 minutes. BUCCA/BUCCA     MICROSCOPIC DESCRIPTION: Microscopic examination performed. An   immunoperoxidase stain for CK5/6 was performed on specimens A and C for   the purpose of ruling out atypia. The stain is absent in foci of   atypical epithelium, consistent with atypical ductal hyperplasia. The immunohistochemical (IHC) stain was performed using a single antibody   on a separate tissue section. No multiple antibody cocktails were used   unless specifically documented. An appropriate positive control was   performed with the antibody and the results were reviewed. The control   stain showed the expected positive result within technically acceptable   parameters. CPT: 13814 X3   65873 X2   Copy to: Jairon Hernandez MD   Case signed out at [facility], [facility] ,   [place], [facility]   Technical processing at [facility], [address] [place] [address]  Phone [phone]         Jaiden Holloway M.D., M.S.   (Electronic Signature)   11/08/2022         Review of Systems   Constitutional: Negative. HENT: Negative. Eyes: Negative. Respiratory: Negative. Cardiovascular: Negative. Gastrointestinal: Negative. Genitourinary: Negative. Musculoskeletal: Negative. Skin: Negative. Neurological: Negative. Psychiatric/Behavioral: Negative. All other systems reviewed and are negative. Past Medical History:   Diagnosis Date    Anxiety     past hx    Depression     past hx    Hypertension     Osteoarthritis     knees    Porphyria cutanea tarda ([facility] [address]     initially treated w phlebotomy then with anti-malarials    Reflux     Thyroid nodule        Past Surgical History:   Procedure Laterality Date    CARPAL TUNNEL RELEASE      right    CERVICAL FUSION      C6-C7    COLONOSCOPY  2017    polyp, diverticulosis    CYST REMOVAL      right wrist    FINE NEEDLE ASPIRATION  2015    Thyroid nodule    HYSTERECTOMY (CERVIX STATUS UNKNOWN)      partial    KNEE ARTHROSCOPY Left 3/15/16    partial medial menisectomy    KNEE SURGERY      right 4/2011 X 2    JOSE STEREO BREAST BX ADD LESION RIGHT Right 2022    JOSE STEREO BREAST BX ADD LESION RIGHT 2022 [facility]    JOSE STEROTACTIC LOC BREAST BIOPSY RIGHT Right 2022    [facility] STEROTACTIC LOC BREAST BIOPSY RIGHT 2022 [facility]    OVARY REMOVAL      bilateral    SHOULDER ARTHROSCOPY Left 10/27/15    SHOULDER SURGERY      right       Allergies as of 2022 - Review Complete 2019   Allergen Reaction Noted    Greene-2 inhibitors Shortness Of Breath 2016    Cephalosporins Hives 2011    Other  2011       Social History     Tobacco Use    Smoking status: Former     Packs/day: 1.00     Years: 15.00     Pack years: 15.00     Types: Cigarettes     Quit date: 10/21/2011     Years since quittin.0    Smokeless tobacco: Never   Substance Use Topics    Alcohol use: Yes     Alcohol/week: 0.0 standard drinks     Comment: 2-3 X per year    Drug use: No         Family History: Mother: Bladder cancer, diagnosed age 76  No additional family history of breast or ovarian cancer.   Please reference the intake form for additional family history. Hormonal History:   a.0  m.0  Menarche at age 16. First live birth at age 17. did breastfeed. Postmenopausal at age 40  Hysterectomy: at age 40  Denies estrogen supplementation currently but used estradiol patch until age 49  OCP taken in the past but not currently    Medications:  Medication documentation has been reviewed in the electronic medical record and patient office intake form. Exam:  There were no vitals taken for this visit. Physical Exam      Constitutional: She appears well-nourished. No apparent distress. Breast: The patient was examined in the upright and supine position. She has a \"D\" cup breast. Breasts are symmetrically ptotic. Right: There were no discrete masses or changes in breast contour. There were no skin changes of the breast or nipple areolar complex. There was no nipple inversion or discharge. Left: There were no discrete masses or changes in breast contour. There were no skin changes of the breast or nipple areolar complex. There was no nipple inversion or discharge. There is no axillary lymphadenopathy palpated bilaterally. Head: Normocephalic andatraumatic. Eyes: EOM are normal. Pupils are equal, round, and reactive to light. Neck: Neck supple. No tracheal deviation present. Cardiovascular: regular rate. Extremities appear well perfused. Pulmonary: respirationsare non-labored and without audible distress  Lymphatics: no palpable axillary or cervical lymphadenopathy. Skin: No rash noted. No erythema. Neurologic: alert and oriented. Assessment/Plan: Ms. Rogelio George is a 65y.o. year-old woman with right breast atypical ductal hyperplasia      I reviewed with Ms. Rogelio George her most recent breast imaging, physical exam findings and biopsy results. We discussed the pathophysiology of her biopsy. We discussed the relative risk of a future cancer in comparison to the general population.   The upgrade rates very better generally

## 2022-11-28 ENCOUNTER — INITIAL CONSULT (OUTPATIENT)
Dept: SURGERY | Age: 57
End: 2022-11-28
Payer: COMMERCIAL

## 2022-11-28 VITALS
HEIGHT: 63 IN | WEIGHT: 201 LBS | BODY MASS INDEX: 35.61 KG/M2 | DIASTOLIC BLOOD PRESSURE: 60 MMHG | SYSTOLIC BLOOD PRESSURE: 106 MMHG | RESPIRATION RATE: 18 BRPM | HEART RATE: 73 BPM

## 2022-11-28 DIAGNOSIS — N60.91 ATYPICAL DUCTAL HYPERPLASIA OF RIGHT BREAST: Primary | ICD-10-CM

## 2022-11-28 PROCEDURE — 3074F SYST BP LT 130 MM HG: CPT | Performed by: SURGERY

## 2022-11-28 PROCEDURE — 99204 OFFICE O/P NEW MOD 45 MIN: CPT | Performed by: SURGERY

## 2022-11-28 PROCEDURE — 3078F DIAST BP <80 MM HG: CPT | Performed by: SURGERY

## 2022-11-28 RX ORDER — SODIUM CHLORIDE, SODIUM LACTATE, POTASSIUM CHLORIDE, CALCIUM CHLORIDE 600; 310; 30; 20 MG/100ML; MG/100ML; MG/100ML; MG/100ML
INJECTION, SOLUTION INTRAVENOUS CONTINUOUS
OUTPATIENT
Start: 2022-11-28

## 2022-11-28 RX ORDER — SODIUM CHLORIDE 0.9 % (FLUSH) 0.9 %
5-40 SYRINGE (ML) INJECTION PRN
OUTPATIENT
Start: 2022-11-28

## 2022-11-28 RX ORDER — SODIUM CHLORIDE 9 MG/ML
INJECTION, SOLUTION INTRAVENOUS PRN
OUTPATIENT
Start: 2022-11-28

## 2022-11-28 RX ORDER — SODIUM CHLORIDE 0.9 % (FLUSH) 0.9 %
5-40 SYRINGE (ML) INJECTION EVERY 12 HOURS SCHEDULED
OUTPATIENT
Start: 2022-11-28

## 2022-11-29 DIAGNOSIS — N60.91 ATYPICAL DUCTAL HYPERPLASIA OF RIGHT BREAST: Primary | ICD-10-CM

## 2022-12-05 ENCOUNTER — TELEPHONE (OUTPATIENT)
Dept: SURGERY | Age: 57
End: 2022-12-05

## 2022-12-05 NOTE — TELEPHONE ENCOUNTER
Patient called in with a surgery question. Pt has an excisional bx on 12/14/22 and her ENT told her that she could have partial Thyroidectomy done 1 week after her surgery with . Patient wanted to make sure that she is allowed to have that surgery with her ENT or if she needs to wait longer to have the surgery.

## 2022-12-06 ENCOUNTER — HOSPITAL ENCOUNTER (OUTPATIENT)
Dept: ULTRASOUND IMAGING | Age: 57
Discharge: HOME | End: 2022-12-06
Payer: COMMERCIAL

## 2022-12-06 ENCOUNTER — HOSPITAL ENCOUNTER (OUTPATIENT)
Dept: WOMENS IMAGING | Age: 57
Discharge: HOME | End: 2022-12-06
Payer: COMMERCIAL

## 2022-12-06 DIAGNOSIS — N60.91 ATYPICAL DUCTAL HYPERPLASIA OF RIGHT BREAST: ICD-10-CM

## 2022-12-06 DIAGNOSIS — R92.8 ABNORMAL MAMMOGRAM: ICD-10-CM

## 2022-12-06 PROCEDURE — 19286 PERQ DEV BREAST ADD US IMAG: CPT

## 2022-12-06 PROCEDURE — A4648 IMPLANTABLE TISSUE MARKER: HCPCS

## 2022-12-06 PROCEDURE — 2500000003 HC RX 250 WO HCPCS: Performed by: SURGERY

## 2022-12-06 PROCEDURE — 77065 DX MAMMO INCL CAD UNI: CPT

## 2022-12-06 RX ORDER — LIDOCAINE HYDROCHLORIDE 10 MG/ML
5 INJECTION, SOLUTION EPIDURAL; INFILTRATION; INTRACAUDAL; PERINEURAL ONCE
Status: COMPLETED | OUTPATIENT
Start: 2022-12-06 | End: 2022-12-06

## 2022-12-06 RX ADMIN — LIDOCAINE HYDROCHLORIDE 5 ML: 10 INJECTION, SOLUTION EPIDURAL; INFILTRATION; INTRACAUDAL; PERINEURAL at 14:32

## 2022-12-06 RX ADMIN — LIDOCAINE HYDROCHLORIDE 5 ML: 10 INJECTION, SOLUTION EPIDURAL; INFILTRATION; INTRACAUDAL; PERINEURAL at 14:23

## 2022-12-06 NOTE — PROGRESS NOTES
Patient here for RFID tag placement x 2 of right breast.  NN reviewed the health history, allergies and medications. Patient drove herself. Radiologist reviews procedure with patient, consent signed. Patient tolerates procedure well. Compression held. Site cleansed with chloraprep, steri strips and dry dressing applied. Ice pack in place. Reviewed discharge instructions with patient and signed copy. Patient verbalized understanding and agreed to contact Nurse Navigator with any questions. Patient A&Ox3, steady on feet and discharged home.

## 2022-12-12 NOTE — PROGRESS NOTES
Name_______________________________________Printed:____________________  Date and time of surgery_____12/14/2022____1000_______________Arrival Time:________0830________   1. The instructions given regarding when and if a patient needs to stop oral intake prior to surgery varies. Follow the specific instructions you were given                  __x_Nothing to eat or to drink after Midnight the night before.                   ____Carbo loading or ERAS instructions will be given to select patients-if you have been given those instructions -please do the following                           The evening before your surgery after dinner before midnight drink 40 ounces of gatorade. If you are diabetic use sugar free. The morning of surgery drink 40 ounces of water. This needs to be finished 3 hours prior to your surgery start time. 2. Take the following pills with a small sip of water on the morning of surgery___cymbalta________________________________________________                  Do not take blood pressure medications ending in pril or sartan the makayla prior to surgery or the morning of surgery. Dr Cherry Varner patient are not to take any medications the AM of surgery. 3. Aspirin, Ibuprofen, Advil, Naproxen, Vitamin E and other Anti-inflammatory products and supplements should be stopped for 5 -7days before surgery or as directed by your physician. 4. Check with your Doctor regarding stopping Plavix, Coumadin,Eliquis, Lovenox,Effient,Pradaxa,Xarelto, Fragmin or other blood thinners and follow their instructions. 5. Do not smoke, and do not drink any alcoholic beverages 24 hours prior to surgery. This includes NA Beer. Refrain from the usage of any recreational drugs. 6. You may brush your teeth and gargle the morning of surgery. DO NOT SWALLOW WATER   7. You MUST make arrangements for a responsible adult to stay on site while you are here and take you home after your surgery.  You will not be allowed to leave alone or drive yourself home. It is strongly suggested someone stay with you the first 24 hrs. Your surgery will be cancelled if you do not have a ride home. 8. A parent/legal guardian must accompany a child scheduled for surgery and plan to stay at the hospital until the child is discharged. Please do not bring other children with you. 9. Please wear simple, loose fitting clothing to the hospital.  Arthea Hippo not bring valuables (money, credit cards, checkbooks, etc.) Do not wear any makeup (including no eye makeup) or nail polish on your fingers or toes. 10. DO NOT wear any jewelry or piercings on day of surgery. All body piercing jewelry must be removed. 11. If you have ___dentures, they will be removed before going to the OR; we will provide you a container. If you wear ___contact lenses or ___glasses, they will be removed; please bring a case for them. 12. Please see your family doctor/pediatrician for a history & physical and/or concerning medications. Bring any test results/reports from your physician's office. PCP__________________Phone___________H&P Appt. Date________             13 If you  have a Living Will and Durable Power of  for Healthcare, please bring in a copy. 15. Notify your Surgeon if you develop any illness between now and surgery  time, cough, cold, fever, sore throat, nausea, vomiting, etc.  Please notify your surgeon if you experience dizziness, shortness of breath or blurred vision between now & the time of your surgery             15. DO NOT shave your operative site 96 hours prior to surgery. For face & neck surgery, men may use an electric razor 48 hours prior to surgery. 16. Shower the night before or morning of surgery using an antibacterial soap or as you have been instructed. 17. To provide excellent care visitors will be limited to one in the room at any given time.              18.  Please bring picture ID and insurance card. 19.  Visit our web site for additional information:  [email or web address]              20.During flu season no children under the age of 15 are permitted in the hospital for the safety of all patients. 21. If you take a long acting insulin in the evening only  take half of your usual  dose the night  before your procedure              22. If you use a c-pap please bring DOS if staying overnight,             23.For your convenience [facility] has a pharmacy on site to fill your prescriptions. 24. If you use oxygen and have a portable tank please bring it  with you the DOS             25. Bring a complete list of all your medications with name and dose include any supplements. 26. Other__________________________________________   *Please call pre admission testing if you any further questions   [facility]   [address] [place] [address]. [facility]  [phone]   [address] [place] [address]       VISITOR POLICY(subject to change)    Current policy is 2 visitors per patient. No children. Mask is  at the discretion of the facility. Visiting hours are 8a-8p. Overnight visitors will be at the discretion of the nurse. All policies subject to change. All above information reviewed with patient in person or by phone. Patient verbalizes understanding. All questions and concerns addressed.                                                                                                  Patient/Rep____patient________________                                                                                                                                    PRE OP INSTRUCTIONS

## 2022-12-14 ENCOUNTER — HOSPITAL ENCOUNTER (OUTPATIENT)
Age: 57
Setting detail: OUTPATIENT SURGERY
Discharge: HOME | End: 2022-12-14
Attending: SURGERY | Admitting: SURGERY
Payer: COMMERCIAL

## 2022-12-14 ENCOUNTER — ANESTHESIA (OUTPATIENT)
Dept: OPERATING ROOM | Age: 57
End: 2022-12-14
Payer: COMMERCIAL

## 2022-12-14 ENCOUNTER — ANESTHESIA EVENT (OUTPATIENT)
Dept: OPERATING ROOM | Age: 57
End: 2022-12-14
Payer: COMMERCIAL

## 2022-12-14 ENCOUNTER — APPOINTMENT (OUTPATIENT)
Dept: WOMENS IMAGING | Age: 57
End: 2022-12-14
Attending: SURGERY
Payer: COMMERCIAL

## 2022-12-14 VITALS
WEIGHT: 197 LBS | DIASTOLIC BLOOD PRESSURE: 67 MMHG | TEMPERATURE: 96.9 F | RESPIRATION RATE: 16 BRPM | SYSTOLIC BLOOD PRESSURE: 118 MMHG | HEIGHT: 63 IN | HEART RATE: 83 BPM | OXYGEN SATURATION: 99 % | BODY MASS INDEX: 34.91 KG/M2

## 2022-12-14 DIAGNOSIS — R92.8 ABNORMAL MAMMOGRAM: ICD-10-CM

## 2022-12-14 DIAGNOSIS — G89.18 POSTOPERATIVE PAIN: Primary | ICD-10-CM

## 2022-12-14 DIAGNOSIS — N60.91 ATYPICAL DUCTAL HYPERPLASIA OF RIGHT BREAST: ICD-10-CM

## 2022-12-14 LAB
ABO/RH: NORMAL
ANTIBODY SCREEN: NORMAL

## 2022-12-14 PROCEDURE — 6360000002 HC RX W HCPCS: Performed by: ANESTHESIOLOGY

## 2022-12-14 PROCEDURE — 88307 TISSUE EXAM BY PATHOLOGIST: CPT

## 2022-12-14 PROCEDURE — 6360000002 HC RX W HCPCS: Performed by: NURSE ANESTHETIST, CERTIFIED REGISTERED

## 2022-12-14 PROCEDURE — 76098 X-RAY EXAM SURGICAL SPECIMEN: CPT

## 2022-12-14 PROCEDURE — 2580000003 HC RX 258: Performed by: SURGERY

## 2022-12-14 PROCEDURE — 3600000004 HC SURGERY LEVEL 4 BASE: Performed by: SURGERY

## 2022-12-14 PROCEDURE — 2709999900 HC NON-CHARGEABLE SUPPLY: Performed by: SURGERY

## 2022-12-14 PROCEDURE — 88305 TISSUE EXAM BY PATHOLOGIST: CPT

## 2022-12-14 PROCEDURE — 86900 BLOOD TYPING SEROLOGIC ABO: CPT

## 2022-12-14 PROCEDURE — 6360000002 HC RX W HCPCS

## 2022-12-14 PROCEDURE — 7100000000 HC PACU RECOVERY - FIRST 15 MIN: Performed by: SURGERY

## 2022-12-14 PROCEDURE — 3700000001 HC ADD 15 MINUTES (ANESTHESIA): Performed by: SURGERY

## 2022-12-14 PROCEDURE — 2500000003 HC RX 250 WO HCPCS: Performed by: NURSE ANESTHETIST, CERTIFIED REGISTERED

## 2022-12-14 PROCEDURE — 2720000010 HC SURG SUPPLY STERILE: Performed by: SURGERY

## 2022-12-14 PROCEDURE — 2500000003 HC RX 250 WO HCPCS: Performed by: SURGERY

## 2022-12-14 PROCEDURE — 7100000010 HC PHASE II RECOVERY - FIRST 15 MIN: Performed by: SURGERY

## 2022-12-14 PROCEDURE — 3600000014 HC SURGERY LEVEL 4 ADDTL 15MIN: Performed by: SURGERY

## 2022-12-14 PROCEDURE — 86850 RBC ANTIBODY SCREEN: CPT

## 2022-12-14 PROCEDURE — 7100000001 HC PACU RECOVERY - ADDTL 15 MIN: Performed by: SURGERY

## 2022-12-14 PROCEDURE — 3700000000 HC ANESTHESIA ATTENDED CARE: Performed by: SURGERY

## 2022-12-14 PROCEDURE — 86901 BLOOD TYPING SEROLOGIC RH(D): CPT

## 2022-12-14 PROCEDURE — 7100000011 HC PHASE II RECOVERY - ADDTL 15 MIN: Performed by: SURGERY

## 2022-12-14 PROCEDURE — 6360000002 HC RX W HCPCS: Performed by: SURGERY

## 2022-12-14 RX ORDER — ONDANSETRON 2 MG/ML
INJECTION INTRAMUSCULAR; INTRAVENOUS PRN
Status: DISCONTINUED | OUTPATIENT
Start: 2022-12-14 | End: 2022-12-14 | Stop reason: SDUPTHER

## 2022-12-14 RX ORDER — PROPOFOL 10 MG/ML
INJECTION, EMULSION INTRAVENOUS PRN
Status: DISCONTINUED | OUTPATIENT
Start: 2022-12-14 | End: 2022-12-14 | Stop reason: SDUPTHER

## 2022-12-14 RX ORDER — FOLIC ACID 1 MG/1
1 TABLET ORAL DAILY
COMMUNITY

## 2022-12-14 RX ORDER — SODIUM CHLORIDE 0.9 % (FLUSH) 0.9 %
5-40 SYRINGE (ML) INJECTION PRN
Status: DISCONTINUED | OUTPATIENT
Start: 2022-12-14 | End: 2022-12-14 | Stop reason: HOSPADM

## 2022-12-14 RX ORDER — SODIUM CHLORIDE 0.9 % (FLUSH) 0.9 %
5-40 SYRINGE (ML) INJECTION EVERY 12 HOURS SCHEDULED
Status: DISCONTINUED | OUTPATIENT
Start: 2022-12-14 | End: 2022-12-14 | Stop reason: HOSPADM

## 2022-12-14 RX ORDER — MAGNESIUM SULFATE HEPTAHYDRATE 500 MG/ML
INJECTION, SOLUTION INTRAMUSCULAR; INTRAVENOUS PRN
Status: DISCONTINUED | OUTPATIENT
Start: 2022-12-14 | End: 2022-12-14 | Stop reason: SDUPTHER

## 2022-12-14 RX ORDER — SUCCINYLCHOLINE/SOD CL,ISO/PF 200MG/10ML
SYRINGE (ML) INTRAVENOUS PRN
Status: DISCONTINUED | OUTPATIENT
Start: 2022-12-14 | End: 2022-12-14 | Stop reason: SDUPTHER

## 2022-12-14 RX ORDER — MIDAZOLAM HYDROCHLORIDE 1 MG/ML
INJECTION INTRAMUSCULAR; INTRAVENOUS PRN
Status: DISCONTINUED | OUTPATIENT
Start: 2022-12-14 | End: 2022-12-14 | Stop reason: SDUPTHER

## 2022-12-14 RX ORDER — HYDROMORPHONE HCL 110MG/55ML
PATIENT CONTROLLED ANALGESIA SYRINGE INTRAVENOUS
Status: COMPLETED
Start: 2022-12-14 | End: 2022-12-14

## 2022-12-14 RX ORDER — ONDANSETRON 2 MG/ML
4 INJECTION INTRAMUSCULAR; INTRAVENOUS
Status: DISCONTINUED | OUTPATIENT
Start: 2022-12-14 | End: 2022-12-14 | Stop reason: HOSPADM

## 2022-12-14 RX ORDER — SODIUM CHLORIDE 9 MG/ML
INJECTION, SOLUTION INTRAVENOUS PRN
Status: DISCONTINUED | OUTPATIENT
Start: 2022-12-14 | End: 2022-12-14 | Stop reason: HOSPADM

## 2022-12-14 RX ORDER — HYDROCODONE BITARTRATE AND ACETAMINOPHEN 5; 325 MG/1; MG/1
1 TABLET ORAL EVERY 4 HOURS PRN
Qty: 42 TABLET | Refills: 0 | Status: SHIPPED | OUTPATIENT
Start: 2022-12-14 | End: 2022-12-21

## 2022-12-14 RX ORDER — LIDOCAINE HYDROCHLORIDE 20 MG/ML
INJECTION, SOLUTION INFILTRATION; PERINEURAL PRN
Status: DISCONTINUED | OUTPATIENT
Start: 2022-12-14 | End: 2022-12-14 | Stop reason: SDUPTHER

## 2022-12-14 RX ORDER — DEXAMETHASONE SODIUM PHOSPHATE 4 MG/ML
INJECTION, SOLUTION INTRA-ARTICULAR; INTRALESIONAL; INTRAMUSCULAR; INTRAVENOUS; SOFT TISSUE PRN
Status: DISCONTINUED | OUTPATIENT
Start: 2022-12-14 | End: 2022-12-14 | Stop reason: SDUPTHER

## 2022-12-14 RX ORDER — FENTANYL CITRATE 50 UG/ML
INJECTION, SOLUTION INTRAMUSCULAR; INTRAVENOUS PRN
Status: DISCONTINUED | OUTPATIENT
Start: 2022-12-14 | End: 2022-12-14 | Stop reason: SDUPTHER

## 2022-12-14 RX ORDER — ROCURONIUM BROMIDE 10 MG/ML
INJECTION, SOLUTION INTRAVENOUS PRN
Status: DISCONTINUED | OUTPATIENT
Start: 2022-12-14 | End: 2022-12-14 | Stop reason: SDUPTHER

## 2022-12-14 RX ORDER — BUPIVACAINE HYDROCHLORIDE 2.5 MG/ML
INJECTION, SOLUTION EPIDURAL; INFILTRATION; INTRACAUDAL
Status: COMPLETED | OUTPATIENT
Start: 2022-12-14 | End: 2022-12-14

## 2022-12-14 RX ORDER — SODIUM CHLORIDE, SODIUM LACTATE, POTASSIUM CHLORIDE, CALCIUM CHLORIDE 600; 310; 30; 20 MG/100ML; MG/100ML; MG/100ML; MG/100ML
INJECTION, SOLUTION INTRAVENOUS CONTINUOUS
Status: DISCONTINUED | OUTPATIENT
Start: 2022-12-14 | End: 2022-12-14 | Stop reason: HOSPADM

## 2022-12-14 RX ORDER — HYDROMORPHONE HCL 110MG/55ML
0.25 PATIENT CONTROLLED ANALGESIA SYRINGE INTRAVENOUS EVERY 5 MIN PRN
Status: DISCONTINUED | OUTPATIENT
Start: 2022-12-14 | End: 2022-12-14 | Stop reason: HOSPADM

## 2022-12-14 RX ORDER — HYDROMORPHONE HCL 110MG/55ML
0.5 PATIENT CONTROLLED ANALGESIA SYRINGE INTRAVENOUS EVERY 5 MIN PRN
Status: DISCONTINUED | OUTPATIENT
Start: 2022-12-14 | End: 2022-12-14 | Stop reason: HOSPADM

## 2022-12-14 RX ADMIN — FENTANYL CITRATE 50 MCG: 50 INJECTION, SOLUTION INTRAMUSCULAR; INTRAVENOUS at 11:45

## 2022-12-14 RX ADMIN — VANCOMYCIN HYDROCHLORIDE 1500 MG: 10 INJECTION, POWDER, LYOPHILIZED, FOR SOLUTION INTRAVENOUS at 09:04

## 2022-12-14 RX ADMIN — Medication 140 MG: at 10:32

## 2022-12-14 RX ADMIN — HYDROMORPHONE HYDROCHLORIDE 0.25 MG: 2 INJECTION, SOLUTION INTRAMUSCULAR; INTRAVENOUS; SUBCUTANEOUS at 12:46

## 2022-12-14 RX ADMIN — HYDROMORPHONE HYDROCHLORIDE 0.25 MG: 2 INJECTION, SOLUTION INTRAMUSCULAR; INTRAVENOUS; SUBCUTANEOUS at 13:00

## 2022-12-14 RX ADMIN — PROPOFOL 160 MG: 10 INJECTION, EMULSION INTRAVENOUS at 10:32

## 2022-12-14 RX ADMIN — SODIUM CHLORIDE, POTASSIUM CHLORIDE, SODIUM LACTATE AND CALCIUM CHLORIDE: 600; 310; 30; 20 INJECTION, SOLUTION INTRAVENOUS at 08:57

## 2022-12-14 RX ADMIN — MAGNESIUM SULFATE HEPTAHYDRATE 1 G: 500 INJECTION, SOLUTION INTRAMUSCULAR; INTRAVENOUS at 10:46

## 2022-12-14 RX ADMIN — MIDAZOLAM 2 MG: 1 INJECTION INTRAMUSCULAR; INTRAVENOUS at 10:24

## 2022-12-14 RX ADMIN — ONDANSETRON 4 MG: 2 INJECTION INTRAMUSCULAR; INTRAVENOUS at 10:38

## 2022-12-14 RX ADMIN — SUGAMMADEX 150 MG: 100 INJECTION, SOLUTION INTRAVENOUS at 12:16

## 2022-12-14 RX ADMIN — LIDOCAINE HYDROCHLORIDE 100 MG: 20 INJECTION, SOLUTION INFILTRATION; PERINEURAL at 10:32

## 2022-12-14 RX ADMIN — DEXAMETHASONE SODIUM PHOSPHATE 4 MG: 4 INJECTION, SOLUTION INTRAMUSCULAR; INTRAVENOUS at 10:38

## 2022-12-14 RX ADMIN — FENTANYL CITRATE 50 MCG: 50 INJECTION, SOLUTION INTRAMUSCULAR; INTRAVENOUS at 10:32

## 2022-12-14 RX ADMIN — ROCURONIUM BROMIDE 10 MG: 10 INJECTION, SOLUTION INTRAVENOUS at 10:32

## 2022-12-14 ASSESSMENT — PAIN DESCRIPTION - ORIENTATION: ORIENTATION: RIGHT

## 2022-12-14 ASSESSMENT — PAIN DESCRIPTION - DESCRIPTORS: DESCRIPTORS: BURNING;DISCOMFORT

## 2022-12-14 ASSESSMENT — PAIN - FUNCTIONAL ASSESSMENT: PAIN_FUNCTIONAL_ASSESSMENT: 0-10

## 2022-12-14 ASSESSMENT — PAIN DESCRIPTION - LOCATION: LOCATION: BREAST

## 2022-12-14 ASSESSMENT — PAIN SCALES - GENERAL: PAINLEVEL_OUTOF10: 5

## 2022-12-14 NOTE — OP NOTE
Operative Note      Postoperative Note    Chapin Bartlett  YOB: 1965  [id number]    Pre-operative Diagnosis: multi-site Atypical ductal hyperplasia of right breast [N60.91]    Post-operative Diagnosis: Same    Procedure: right localized 2 site excisional breast biopsy    Anesthesia: General     Surgeons/Assistants: Marichuy Archuleta  Assistant: Rose Johnston    Estimated Blood Loss: less than 50     Drains: none    Complications: none apparent by completion of procedure    Specimens: right breast tissue 9:00 and 12:00    Findings: specimen radiograph shows capture of lesion in question    Post-Op Condition: Stable    Disposition: to recovery room    Description of Procedure:   Ms. Yisel Rosado is a 65 y.o. woman with right breast atypical ductal hyperplasia of in two sites. She has elected to proceed with right excisional breast biopsy to assess possibility of upgrade diagnosis. The indications for the planned procedure, along with the potential benefits and risks which include but are not limited to the risk of anesthesia, bleeding, infection, possible failed operation, possible need for additional surgery pending final pathologic assessment, sensation changes, and unappealing cosmetics were reviewed. All questions were answered and she agrees to proceed. Ms. Yisel Rosado was met by me in the preoperative area. The surgical site was identified. The patient's surgical site was marked. Consent was obtained. The appropriate breast imaging was reviewed. She underwent an image guided localization procedure preoperatively. The right breast lesions were again noted with the biopsy clips. The localizers are in good position. There was some noted migration of biopsy clips. She was brought to the operating room and placed supine with her arms extended on boards. She was appropriately positioned and padded. Compression stockings were placed.   Appropriate antibiotics were administered within 60 minutes of the incision. Breast imaging was available in the room. After induction of general anesthesia, the appropriate World Health Organization timeout procedure was performed. The right breast and axillary region, upper arm, and chest wall were prepped and draped in the normal sterile fashion. There were two localizers placed by the mammographer marking the right breast lesions at 9:00 and 12:00. The 9:00 atypia marked by the open coil clip and TAG [id number]. The 12:00 atypia marked by the butterfly clip and TAG# [id number]. Attention was first turned to the 9:00 lesion. The skin and soft tissues over the proposed incision were infiltrated with local. A radial was made at the 8:00 location. Flaps were raised and dissection was carried out in a rectangular fashion around the target with localizer. An ellipse of sumanth-incisional skin was removed atop the lesion as anterior margin. Dissection was not carried to the chest wall. Dissection was carried out carefully to try and maintain the localizer centrally within the specimen. The specimen was excised in toto. The specimen was oriented with a margin map. It was submitted for specimen radiography which revealed the lesion in question with closest anterior but otherwise adequate radiographic margins. An additional margin was obtained anterior (see above) and oriented with a short stitch superior, long lateral and skin marked skin. The wound was irrigated and hemostasis was obtained. We then directed attention to the 12:00 lesion, The skin and soft tissues over the proposed incision were infiltrated with local. A curvilinear was made at the 12:00 location. Flaps were raised and dissection was carried out in a rectangular fashion around the target with localizer. No skin was removed. Dissection was not carried to the chest wall. Dissection was carried out carefully to try and maintain the localizer centrally within the specimen. The specimen was excised in toto. The specimen was oriented with a margin map. It was submitted for specimen radiography which revealed the lesion in question with adequate radiographic margins. No additional margins were obtained. The wound was irrigated and hemostasis was obtained. The incisions were re-approximated and closed in layers using a 3-0 vicryl stitch followed by a 3-0 dermal and 4-0 monocryl subcuticular approximation. Skin glue was applied. The instrument, sponge, and needle counts were correct. Ms. Karen Sutton was awakened and placed into a surgical bra. She was taken to the recovery room in stable condition. Her family was notified of intraoperative findings.       Electronically signed by Dominick Roe MD on 12/14/22 at 10:02 AM EST

## 2022-12-14 NOTE — INTERVAL H&P NOTE
H&P Update    The patient's most recent H&P, office notes, breast imaging, and pathology were reviewed. Patient examined and laterality marked. There has been no changes. We will plan to proceed with a right two site excisional breast biopsy. The patient was counseled at length about the risks of fadumo Covid-19 during their perioperative period and any recovery window from their procedure. The patient was made aware that fadumo Covid-19  may worsen their prognosis for recovering from their procedure  and lend to a higher morbidity and/or mortality risk. All material risks, benefits, and reasonable alternatives including postponing the procedure were discussed. The patient does wish to proceed with the procedure at this time.         Balbina Clemens MD

## 2022-12-14 NOTE — PROGRESS NOTES
Pt arrived to PACU from OR in Stable condition and is RASS -1 (Drowsy) . Respirations are Regular Pattern; RR 8-20 = 15 on 6L O2 per  simple mask . Skin warm and dry. Abd is  soft. Pain: denies at this time. Right breast surgical site(s) intact with dressing= clean, dry, intact, and nontender. Surgical bra in place      Will continue to monitor for safety and comfort. S/P: RIGHT LOCALIZED TWO SITE EXCISIONAL BREAST BIOPSY (Right: Breast, with Dr. Bryanna Vieira at [facility].

## 2022-12-14 NOTE — PROGRESS NOTES
Pain tolerable after dilaudid IV. Drinking gingerale. No nausea. Ice pack to right breast. VSS. Discharge instructions reviewed with pt and family.

## 2022-12-14 NOTE — ANESTHESIA PRE PROCEDURE
Department of Anesthesiology  Preprocedure Note       Name:  Mary Beth Horan   Age:  65 y.o.  :  1965                                          MRN:  [id number]         Date:  2022      Surgeon: Bettye Cortez):  Grace Villegas MD    Procedure: Procedure(s):  RIGHT LOCALIZED TWO SITE EXCISIONAL BREAST BIOPSY    Medications prior to admission:   Prior to Admission medications    Medication Sig Start Date End Date Taking? Authorizing Provider   HYDROcodone-acetaminophen (NORCO) 5-325 MG per tablet Take 1 tablet by mouth every 4 hours as needed for Pain for up to 7 days. Intended supply: 7 days.  Take lowest dose possible to manage pain 22 Yes Grace Villegas MD   folic acid (FOLVITE) 1 MG tablet Take 1 mg by mouth daily   Yes Historical Provider, MD   methotrexate (RHEUMATREX) 2.5 MG chemo tablet Take 20 mg by mouth once a week   Yes Historical Provider, MD   lisinopril (PRINIVIL;ZESTRIL) 5 MG tablet Take by mouth daily    Historical Provider, MD   CHOLECALCIFEROL PO Take 50 mcg by mouth daily    Historical Provider, MD   DULoxetine (CYMBALTA) 30 MG extended release capsule TAKE 1 CAPSULE EVERY DAY 19   Historical Provider, MD   Famotidine (PEPCID PO) Take by mouth    Historical Provider, MD   Omega-3 Fatty Acids (FISH OIL PO) Take 1 tablet by mouth nightly    Historical Provider, MD   hydroxychloroquine (PLAQUENIL) 200 MG tablet Take 200 mg by mouth Twice a Week     Historical Provider, MD   cetirizine (ZYRTEC) 10 MG tablet Take 10 mg by mouth daily    Historical Provider, MD   diphenhydrAMINE (BENADRYL) 25 MG tablet Take 25 mg by mouth as needed for Itching    Historical Provider, MD   SPIRONOLACTONE-HCTZ PO Take 50 mg by mouth daily     Historical Provider, MD       Current medications:    Current Facility-Administered Medications   Medication Dose Route Frequency Provider Last Rate Last Admin    lactated ringers infusion   IntraVENous Continuous Grace Villegas MD  sodium chloride flush 0.9 % injection 5-40 mL  5-40 mL IntraVENous 2 times per day Emerita Quiros MD        sodium chloride flush 0.9 % injection 5-40 mL  5-40 mL IntraVENous PRN Emerita Quiros MD        0.9 % sodium chloride infusion   IntraVENous PRN Emerita Quiros MD        vancomycin (VANCOCIN) 1,500 mg in dextrose 5 % 250 mL IVPB  1,500 mg IntraVENous On Call to [facility] MD           Allergies:     Allergies   Allergen Reactions    Greene-2 Inhibitors Shortness Of Breath     Wheezing/SOB    Cephalosporins Hives     Unknown reaction    Other      Greene II inhibitors cause shortness of breath       Problem List:    Patient Active Problem List   Diagnosis Code    Knee pain M25.569    Localized osteoarthrosis, lower leg M17.10    Tear of medial cartilage or meniscus of knee, current [id number]    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    Rotator cuff tendinitis M75.80    Iron deficiency E61.1    Therapeutic procedure Z51.89    Primary osteoarthritis of left knee M17.12    Acute medial meniscus tear of left knee S83.242A    S/P arthroscopy of knee Z98.890    Abdominal adhesions K66.0    Anxiety F41.9    Arthritis of finger of both hands M19.041, M19.042    Colon, diverticulosis K57.30    Essential hypertension I10    Low back pain M54.50    Thyroid nodule E04.1    Hypoglycemia E16.2    Excessive biotin intake R68.89    Family history of diabetes mellitus Z83.3    Non morbid obesity due to excess calories E66.09    Myalgia and myositis [id number]       Past Medical History:        Diagnosis Date    Anxiety     past hx    Depression     past hx    Hypertension     Osteoarthritis     knees    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 [address] 2001    initially treated w phlebotomy then with anti-malarials    Reflux     Thyroid nodule        Past Surgical History:        Procedure Laterality Date    CARPAL TUNNEL RELEASE      right    CERVICAL FUSION      C6-C7    COLONOSCOPY  2017    polyp, diverticulosis    CYST REMOVAL      right wrist    FINE NEEDLE ASPIRATION  2015    Thyroid nodule    HYSTERECTOMY (CERVIX STATUS UNKNOWN)      partial    KNEE ARTHROSCOPY Left 3/15/16    partial medial menisectomy    KNEE SURGERY      right 4/2011 X 2    JOSE STEREO BREAST BX ADD LESION RIGHT Right 2022    JOSE STEREO BREAST BX ADD LESION RIGHT 2022 [facility]    JOSE STEROTACTIC LOC BREAST BIOPSY RIGHT Right 2022    [facility] STEROTACTIC LOC BREAST BIOPSY RIGHT 2022 [facility]    OVARY REMOVAL      bilateral    SHOULDER ARTHROSCOPY Left 10/27/15    SHOULDER SURGERY      right    US GUIDED NEEDLE LOC BREAST ADDL RIGHT Right 2022    US GUIDED NEEDLE LOC BREAST ADDL RIGHT 2022 [facility] ULTRASOUND    US GUIDED NEEDLE LOC OF RIGHT BREAST Right 2022    US GUIDED NEEDLE LOC OF RIGHT BREAST 2022 [facility] ULTRASOUND       Social History:    Social History     Tobacco Use    Smoking status: Former     Packs/day: 1.00     Years: 15.00     Pack years: 15.00     Types: Cigarettes     Quit date: 10/21/2011     Years since quittin.1    Smokeless tobacco: Never   Substance Use Topics    Alcohol use: Yes     Alcohol/week: 0.0 standard drinks     Comment: 2-3 X per year                                Counseling given: Not Answered      Vital Signs (Current):   Vitals:    22 1257   Weight: 198 lb (89.8 kg)                                              BP Readings from Last 3 Encounters:   22 106/60   19 105/72   19 133/79       NPO Status:  before mn                                                                                BMI:   Wt Readings from Last 3 Encounters:   22 198 lb (89.8 kg)   22 201 lb (91.2 kg)   19 189 lb (85.7 kg)     Body mass index is 35.07 kg/m².     CBC:   Lab Results   Component Value Date/Time    WBC 6.8 2016 03:19 PM    RBC 5.07 2016 03:19 PM HGB 13.3 01/20/2016 03:19 PM    HCT 42.7 01/20/2016 03:19 PM    MCV 84.2 01/20/2016 03:19 PM    RDW 16.4 01/20/2016 03:19 PM     01/20/2016 03:19 PM       CMP:   Lab Results   Component Value Date/Time     11/12/2015 03:58 PM    K 4.0 11/12/2015 03:58 PM     11/12/2015 03:58 PM    CO2 24 11/12/2015 03:58 PM    BUN 14 11/12/2015 03:58 PM    CREATININE 0.95 11/12/2015 03:58 PM    GFRAA 76 11/12/2015 03:58 PM    AGRATIO 1 11/12/2015 03:58 PM    LABGLOM >60.0 10/01/2015 03:34 PM    GLUCOSE 71 08/22/2019 08:33 AM    GLUCOSE 118 10/01/2015 03:34 PM    PROT 6.3 11/12/2015 03:58 PM    PROT 7.1 10/01/2015 03:34 PM    CALCIUM 8.9 11/12/2015 03:58 PM    BILITOT 0.3 11/12/2015 03:58 PM    ALKPHOS 87 11/12/2015 03:58 PM    AST 27 11/12/2015 03:58 PM    ALT 33 11/12/2015 03:58 PM       POC Tests: No results for input(s): POCGLU, POCNA, POCK, POCCL, POCBUN, POCHEMO, POCHCT in the last 72 hours.     Coags: No results found for: PROTIME, INR, APTT    HCG (If Applicable): No results found for: PREGTESTUR, PREGSERUM, HCG, HCGQUANT     ABGs: No results found for: PHART, PO2ART, LMK5NCQ, SKR0QKV, BEART, U8KPVIRR     Type & Screen (If Applicable):  No results found for: LABABO, LABRH    Drug/Infectious Status (If Applicable):  No results found for: HIV, HEPCAB    COVID-19 Screening (If Applicable): No results found for: COVID19        Anesthesia Evaluation  Patient summary reviewed and Nursing notes reviewed  Airway: Mallampati: II  TM distance: >3 FB   Neck ROM: full  Mouth opening: > = 3 FB   Dental: normal exam   (+) caps and bridge      Pulmonary:normal exam  breath sounds clear to auscultation                             Cardiovascular:  Exercise tolerance: good (>4 METS),   (+) hypertension: mild,     (-) past MI and CAD      Rhythm: regular                      Neuro/Psych:   (+) neuromuscular disease:, psychiatric history:            GI/Hepatic/Renal:   (+) liver disease:,           Endo/Other: Negative Endo/Other ROS                    Abdominal:             Vascular: negative vascular ROS. Other Findings:           Anesthesia Plan      general     ASA 2       Induction: intravenous. MIPS: Postoperative opioids intended and Prophylactic antiemetics administered. Anesthetic plan and risks discussed with patient. Plan discussed with CRNA.                     Antonino Abdul MD   12/14/2022

## 2022-12-14 NOTE — DISCHARGE INSTRUCTIONS
Postoperative Instructions for Breast Surgery  Carlee Berkowitz MD [address] [place] [address], [address]  ([phone])    These are general guidelines to help assist in recovery after breast surgery. Please keep in mind all patients recover differently, so please call the office with any questions ([phone]). General guidelines   Rest when you feel tired  You will have a surgical bra on when you wake up. Please wear this day and night until your postoperative appointment. It can be removed for laundering and for showers. This helps immobilize the breast to alleviate discomfort as well as maintain pressure to avoid postoperative seroma. If you had a sentinel lymph node procedure, it is common to have an interval of blue urine and/or stool and blue skin changes of the breast.   Final pathology will take 3-5 days to result. The breast surgery office will call you with the results when they are known. Pain management  You may feel mild to moderate discomfort when anesthesia wears off  You will be given a prescription for a narcotic pain medication  Some patients experience very little discomfort and they prefer not to use the narcotic  You may take Tylenol or extra strength Tylenol instead of the narcotic  Many narcotics also contained Tylenol so you should not take both  AVOID aspirin, fish oil, Excedrin, Motrin, ibuprofen, and other NSAIDS immediately after surgery for at least 48-72 hours. Anticoagulation such as warfarin can typically resume 48 hours after surgery. This should be discussed with your surgeon and prescribing physician. Narcotic medication may cause constipation. Make sure to keep well hydrated, ambulate, and you may eat high-fiber foods to help avoid constipation. You may use over-the-counter medications for constipation. You should not consume alcohol while taking narcotics  You should not drive while taking narcotics  Pain should improve, not worsen as days pass.  If pain worsens, please call the office immediately. Wound care  Your incision has dissolvable stitches under the skin and surgical strength skin glue. You are also covered with a surgical bra and fluff padding. Wearing the bra helps reduce swelling and pain. Please wear it day and night until your postoperative appointment with the exception of laundering and bathing. If the surgical bra is uncomfortable for you, please wear a sports bra that applies compression. Do not place ointment or lotions on your incision  It is okay to remove the fluff padding after 24-48 hours  You may shower in 24-48 hours. The water may run over your wounds but do not scrub the surgical glue. The incisions dry after showering with a clean towel. Do not take a tub bath where your incision will be submerged into water until it is fully healed in 4-8 weeks   Do not swim with a fresh incision  Swelling and bruising in the surgical site is considered normal. It is not normal to have excessive bleeding or drainage from the wound. If you notice this, please call the office immediately. Please call the office if you notice a fever as this may be a sign of infection. If you had surgical drains placed, empty them 2-3 times a day or when the bulb is full. Please record the amount drained and color daily. This is used to identify when they can be removed in the office. A home care nurse may be assigned to check in on your progress. Activity  You may resume most daily activities the day after surgery  Avoid strenuous activity, heavy lifting (5-7 pounds), and vigorous exercise until seen at your postoperative appointment  Walking is in normal activity that can be restarted right away  Avoid any direct trauma to the surgical area  You may resume driving when you are no longer taking narcotics, pain free, and you feel safe turning the wheel and stopping quickly  Everyone recovers at different paces.  You may be able to return to work in the next one to several weeks. Most people return to work in 2-4 weeks, however, this depends on your type of work and overall health. For patients undergoing mastectomy and/or axillary dissection, you may begin arm exercises that after surgery. It is important to start slowly and gradually increase your activity. You may be referred to physical therapy for additional exercises. If you have a drain in place, be cautious of this and only perform light activity. If you had reconstructive surgery, please discuss activity limitations with your plastic surgeon    Diet  You may encounter nausea following surgery. Drink clear liquids or popsicles until you are feeling better. You have no diet restrictions and may resume a regular healthy diet immediately. You may add fiber or any over the counter stool softener/laxative to your diet to help with constipation incurred by narcotic pain medications. You can resume all of your regular medications immediately. He should discuss with her physician one to resume blood thinners. Follow-up  A follow-up appointment has been made for you. If you don't know at this appointment date is please call the office at ([phone]). When to contact us  Please call the surgical office immediately if you notice any of the following: Excessive pain, persistent fever, excessive bleeding or swelling, redness surrounding the wound, drainage from the wound, reactions to medications, or any general concerns or worsening of overall condition. The breast surgery office can be reached at ([phone]). ANESTHESIA DISCHARGE INSTRUCTIONS    Wear your seatbelt home. You are under the influence of drugs-do not drink alcohol, drive, operate machinery, make any important decisions or sign any legal documents for 24 hours. Children should not ride bikes, [place] or play on gym sets for 24 hours after surgery. A responsible adult needs to be with you for 24 hours.   You may experience lightheadedness, dizziness, or sleepiness following surgery. Rest at home today- increase activity as tolerated. Progress slowly to a regular diet unless your physician has instructed you otherwise. Drink plenty of water. If persistent nausea and vomiting becomes a problem, call your physician. Coughing, sore throat and muscle aches are other side effects of anesthesia, and should improve with time. Do not drive or operate machinery while taking narcotics. Females of childbearing potential and on hormonal birth control, should use two forms of contraception following procedure if given a medication called sugammadex and/or emend. Additional contraception should be used for 7 days for sugammadex and/or 28 days for emend. These medications have a potential to reduce the effectiveness of hormonal birth control.

## 2022-12-14 NOTE — ANESTHESIA POSTPROCEDURE EVALUATION
Department of Anesthesiology  Postprocedure Note    Patient: Jane Lagos  MRN: [id number]  YOB: 1965  Date of evaluation: 12/14/2022      Procedure Summary     Date: 12/14/22 Room / Location: [address]    Anesthesia Start: 1025 Anesthesia Stop: 1227    Procedure: RIGHT LOCALIZED TWO SITE EXCISIONAL BREAST BIOPSY (Right: Breast) Diagnosis:       Atypical ductal hyperplasia of right breast      (Atypical ductal hyperplasia of right breast [N60.91])    Surgeons: Tamia Starkey MD Responsible Provider: Britta Griffin MD    Anesthesia Type: general ASA Status: 2          Anesthesia Type: No value filed.     Ricardo Phase I: Ricardo Score: 10    Ricardo Phase II:        Anesthesia Post Evaluation    Patient location during evaluation: PACU  Patient participation: complete - patient participated  Level of consciousness: awake  Airway patency: patent  Nausea & Vomiting: no nausea and no vomiting  Complications: no  Cardiovascular status: blood pressure returned to baseline  Respiratory status: acceptable  Hydration status: stable

## 2022-12-28 NOTE — PROGRESS NOTES
Multiple sites of right breast atypical ductal hyperplasia  S/p EBB,     At high risk for breast cancer    Ms. Sharri Cuevas is a 65y.o.-year-old woman who is now s/p right  multi site excisional breast biopsy for high risk lesions. She underwent this procedure on 2022 and tolerated it well. She is doing quite well postoperatively and her pain is continuing to improve. INTERVAL HISTORY:  On 2022 she underwent multi site excisional biopsy of the right breast.  Pathology of the right breast identified no further atypia. Biopsy site changes are identified. There is no sign of malignancy. [id number]     Pathology:    Department of Pathology   FINAL SURGICAL PATHOLOGY REPORT   Patient Name:  Dragan Tony           Accession No:  [id number]    Age Sex:   1965    65 Y / F       Location:      [facility]   Account No:    [de-identified]                  Collected:     2022   Med Rec No:    [id number]                 Received:      2022   Attend Phys:   Kedar Vázquez             Completed:     2022   Perform Phys:  Sunni GUTIERREZ             FINAL DIAGNOSIS:     A. Right breast at 9 o'clock, excisional biopsy:   - Benign breast tissue with adenosis, fibrocystic changes, columnar cell   change, calcifications, and biopsy site changes. - Negative for malignancy. B.  Right breast at 12 o'clock, excisional biopsy:   - Benign breast tissue with fibrocystic changes, columnar cell change,   and biopsy site changes. - Negative for malignancy. C.  Right breast tissue new anterior margin at 9 o'clock, excision:   - Benign skin and subcutaneous tissue.   - Negative for malignancy. BUCCA/BUCCA       Preoperative Diagnosis:  Atypical ductal hyperplasia, right breast   Postoperative Diagnosis:  Atypical ductal hyperplasia, right breast     SPECIMEN:   A.   RIGHT BREAST TISSUE WITH  MARGIN MAP   B.  RIGHT [id number] FORMALIN 1205   C.  RIGHT BREAST TISSUE ANT. MARGIN     GROSS DESCRIPTION:   A. Received in formalin on a grid, labeled with the patient's name   \"Day Zuñiga" and designated \"right breast tissue 9 o'clock\" is a   29 gram portion of breast tissue that measures 6.5 cm s/i x 4.6 cm a/p x   1.8 cm m/l. Orienting tags stephenie the cranial, medial and skin aspects. The margins are inked as follows:  anterior - orange, posterior - black,   superior - blue, inferior - green, medial - red and lateral - yellow. Sectioning reveals a 2.5 x 1.6 x 0.9 cm irregular tan-white fibrous mass. This mass involves the anterior and medial margins. This mass is 2.5 cm   from the posterior margin, 1.2 cm from the superior margin, 1.4 cm from   the inferior margin and 0.3 cm from the lateral margin. The mass   contains a gel-filled biopsy site and a localization seed. The biopsy   site contains a coiled biopsy marker. The remaining breast tissue   consists of 90% lobulated yellow adipose tissue and 10% pink-white   fibrous tissue. No other lesions are identified grossly. Representative   sections are submitted, including the entire mass. Summary of sections:   A1: superior margin, perpendicular   A2: breast tissue   A3: breast tissue with mass   A4-A5: breast tissue cross-sectioned with mass, paired   A6-A8: breast tissue with mass   A9-A10: breast tissue cross-sectioned with mass, paired   A11: breast tissue with mass   A12: breast tissue   A13: inferior margin, perpendicular     The tissue from part A was placed in formalin at 1143 on 12/14/22 for a   formalin fixation time of greater than 6 and less than 72 hours. B. Received in formalin on a grid, labeled with the patient's name   Allison Weaver" and designated \"right breast tissue 12 o'clock\" is a   23.5 gram portion of breast tissue, measuring 5.8 cm a/p x 4.4 cm s/i x   1.5 cm m/l. Orienting tags stephenie the caudal, deep and lateral aspects.    The margins are inked as follows:  anterior - orange, posterior - black,   superior - blue, inferior - green, medial - red, lateral - yellow. Sectioning reveals a 1.5 x 0.6 x 0.5 cm gel-filled biopsy site. This   biopsy site contains a metallic biopsy marker. The biopsy site has an   adjacent localization seed. The biopsy site is 2.0 cm from the anterior   margin, 1.7 cm from the posterior margin, 1.5 cm from the superior   margin, 1.0 cm from the inferior margin, 0.1 cm from the medial margin,   and 0.5 cm from the lateral margin. No suspicious masses are identified   grossly. The remaining breast tissue consist of 90% lobulated yellow   adipose tissue and 10% pink-white fibrous tissue. Representative   sections are submitted from anterior to posterior, including the entire   biopsy site. Summary of sections:   B1: anterior margin, perpendicular   B2-B4: breast tissue   B5-B6: breast tissue cross-sectioned with biopsy site, paired   B7-B8: breast tissue cross-sectioned with biopsy site, paired   B9-B11: breast tissue with biopsy site   B12: breast tissue   B13: posterior margin, perpendicular     The tissue from part B was placed in formalin at 1205 on 12/14/22 for a   formalin fixation time of greater than 6 and less than 72 hours. C. Received in formalin, labeled with the patient's name \"Day Humphrey" and designated \"right breast tissue 9 o'clock anterior margin\"   is a curved strip of tan skin with underlying breast tissue, measuring   4.0 x 0.5 cm and excised to a depth of 0.8 cm. A long stitch marks   lateral and a short stitch marks superior. The edge margins are inked as   follows: superior - blue, lateral - yellow, and inferior - green. The   inner edge margin is inked orange. Sectioned and submitted entirely in   cassettes C1-C3 from superior to inferior. BUCCA/BUCCA     MICROSCOPIC DESCRIPTION: Microscopic examination performed.      CPT: 30669 Devon Lass   [id number] X2     Case signed out at [facility], [address], [facility]   Technical processing at [facility], [address]  Phone [phone]         Ron Recinos M.D., M.S.   (Electronic Signature)   12/16/2022     Exam:  General: no acute distress  Breast: There are well healing scars on the right breast.  There is no active drainage or signs of infection. There is no ecchymosis, apparent hematomas or significant seromas present. She has good range of motion with her arm. Respiratory: respirations are non-labored and there is no audible distress  Cardiovascular: regular rate, extremities appear well perfused  Neurologic: alert, oriented      Assessment/Plan:  Multiple sites of right breast atypical ductal hyperplasia  S/p EBB, 2022    At high risk for breast cancer. Her pathology results were reviewed with her in detail today. She was provided a copy of her pathology report for her records. At this time she can resume normal activity and wearing her regular bras. She should be fully healed in another 6 weeks at which time she can begin massage with lotion to soften scar tissue. Her lifetime risk for breast cancer is 46.5% (general population average 10%). I reviewed her high risk status. We reviewed screening recommendations including a conversation on mammography, exam and MRI imaging. We also discussed risk reduction options including chemoprophylaxis and risk reduction surgery for which she is not currently interested. We will review risk reduction and screening at her next encounter. I encouraged her to continue self breast evaluation. Follow up surveillance was discussed. Our plan at this time is to follow up with surgical breast oncology office in 6 months interval right breast imaging. All of the patient's questions were answered at this time, but she was encouraged to call the office with any further inquiries.

## 2023-01-05 ENCOUNTER — OFFICE VISIT (OUTPATIENT)
Dept: SURGERY | Age: 58
End: 2023-01-05

## 2023-01-05 VITALS
HEIGHT: 63 IN | RESPIRATION RATE: 18 BRPM | SYSTOLIC BLOOD PRESSURE: 99 MMHG | DIASTOLIC BLOOD PRESSURE: 61 MMHG | BODY MASS INDEX: 34.91 KG/M2 | HEART RATE: 82 BPM | WEIGHT: 197 LBS

## 2023-01-05 DIAGNOSIS — Z91.89 AT HIGH RISK FOR BREAST CANCER: ICD-10-CM

## 2023-01-05 DIAGNOSIS — N60.91 ATYPICAL DUCTAL HYPERPLASIA OF RIGHT BREAST: ICD-10-CM

## 2023-01-05 DIAGNOSIS — Z09 POSTOP CHECK: Primary | ICD-10-CM

## 2023-01-05 PROCEDURE — 99024 POSTOP FOLLOW-UP VISIT: CPT | Performed by: SURGERY

## 2023-01-09 DIAGNOSIS — Z91.89 AT HIGH RISK FOR BREAST CANCER: ICD-10-CM

## 2023-01-09 DIAGNOSIS — Z87.42 HISTORY OF ATYPICAL HYPERPLASIA OF BREAST: Primary | ICD-10-CM

## 2023-08-01 NOTE — PROGRESS NOTES
are no current signs of malignancy. We discussed follow-up imaging of the breast findings. Signs/symptoms of malignancy were reviewed. She verbalizes understanding that she should notify our office if she identifies any abnormalities on self evaluation as it may require further workup. I encouraged her to continue self breast evaluation. Follow up surveillance was discussed. Her lifetime risk for breast cancer is 46.5% (general population average 10%). I reviewed her high risk status. We reviewed screening recommendations including a conversation on mammography, exam and MRI imaging. We also discussed risk reduction options including chemoprophylaxis and risk reduction surgery for which she is not currently interested. She is interested in high risk screening. Our plan at this time is to follow up with surgical breast oncology office in ~6 months for a clinical breast exam.  She believes her gynecology office does a physical exam but is aware our office can perform screening exams as needed. Bilateral diagnostic breast imaging will be planned for November 2023. High risk MRI can be planned for May 2024. All of the patient's questions were answered at this time however, she was encouraged to call the office with any further inquiries. Approximately 20 minutes of time were spent in preparation, direct patient contact, care coordination, documentation and activities otherwise related to this encounter.

## 2023-08-03 ENCOUNTER — HOSPITAL ENCOUNTER (OUTPATIENT)
Dept: WOMENS IMAGING | Age: 58
Discharge: HOME | End: 2023-08-03
Payer: COMMERCIAL

## 2023-08-03 ENCOUNTER — OFFICE VISIT (OUTPATIENT)
Dept: SURGERY | Age: 58
End: 2023-08-03
Payer: COMMERCIAL

## 2023-08-03 VITALS
RESPIRATION RATE: 18 BRPM | OXYGEN SATURATION: 99 % | HEIGHT: 63 IN | DIASTOLIC BLOOD PRESSURE: 68 MMHG | HEART RATE: 77 BPM | WEIGHT: 186 LBS | BODY MASS INDEX: 32.96 KG/M2 | SYSTOLIC BLOOD PRESSURE: 102 MMHG

## 2023-08-03 VITALS — WEIGHT: 195 LBS | BODY MASS INDEX: 34.54 KG/M2

## 2023-08-03 DIAGNOSIS — Z91.89 AT HIGH RISK FOR BREAST CANCER: ICD-10-CM

## 2023-08-03 DIAGNOSIS — Z12.39 BREAST CANCER SCREENING, HIGH RISK PATIENT: ICD-10-CM

## 2023-08-03 DIAGNOSIS — Z87.42 HISTORY OF ATYPICAL HYPERPLASIA OF BREAST: ICD-10-CM

## 2023-08-03 DIAGNOSIS — Z98.890 HISTORY OF BENIGN BREAST BIOPSY: Primary | ICD-10-CM

## 2023-08-03 DIAGNOSIS — R92.2 DENSE BREAST TISSUE ON MAMMOGRAM: ICD-10-CM

## 2023-08-03 PROCEDURE — G8427 DOCREV CUR MEDS BY ELIG CLIN: HCPCS | Performed by: SURGERY

## 2023-08-03 PROCEDURE — 3078F DIAST BP <80 MM HG: CPT | Performed by: SURGERY

## 2023-08-03 PROCEDURE — G9899 SCRN MAM PERF RSLTS DOC: HCPCS | Performed by: SURGERY

## 2023-08-03 PROCEDURE — 3074F SYST BP LT 130 MM HG: CPT | Performed by: SURGERY

## 2023-08-03 PROCEDURE — 1036F TOBACCO NON-USER: CPT | Performed by: SURGERY

## 2023-08-03 PROCEDURE — 3017F COLORECTAL CA SCREEN DOC REV: CPT | Performed by: SURGERY

## 2023-08-03 PROCEDURE — G0279 TOMOSYNTHESIS, MAMMO: HCPCS

## 2023-08-03 PROCEDURE — 99213 OFFICE O/P EST LOW 20 MIN: CPT | Performed by: SURGERY

## 2023-08-03 PROCEDURE — G8417 CALC BMI ABV UP PARAM F/U: HCPCS | Performed by: SURGERY

## 2023-11-10 ENCOUNTER — HOSPITAL ENCOUNTER (OUTPATIENT)
Dept: WOMENS IMAGING | Age: 58
Discharge: HOME | End: 2023-11-10
Payer: COMMERCIAL

## 2023-11-10 DIAGNOSIS — Z98.890 HISTORY OF BENIGN BREAST BIOPSY: ICD-10-CM

## 2023-11-10 DIAGNOSIS — Z91.89 AT HIGH RISK FOR BREAST CANCER: ICD-10-CM

## 2023-11-10 DIAGNOSIS — Z12.39 BREAST CANCER SCREENING, HIGH RISK PATIENT: ICD-10-CM

## 2023-11-10 DIAGNOSIS — R92.2 DENSE BREAST TISSUE ON MAMMOGRAM: ICD-10-CM

## 2023-11-10 PROCEDURE — G0279 TOMOSYNTHESIS, MAMMO: HCPCS

## 2023-11-17 DIAGNOSIS — Z91.89 AT HIGH RISK FOR BREAST CANCER: ICD-10-CM

## 2023-11-17 DIAGNOSIS — Z98.890 HISTORY OF BENIGN BREAST BIOPSY: ICD-10-CM

## 2023-11-17 DIAGNOSIS — R92.333 HETEROGENEOUSLY DENSE TISSUE OF BOTH BREASTS ON MAMMOGRAPHY: Primary | ICD-10-CM

## 2024-05-10 ENCOUNTER — HOSPITAL ENCOUNTER (OUTPATIENT)
Dept: MRI IMAGING | Age: 59
Discharge: HOME | End: 2024-05-10
Payer: COMMERCIAL

## 2024-05-10 DIAGNOSIS — Z98.890 HISTORY OF BENIGN BREAST BIOPSY: ICD-10-CM

## 2024-05-10 DIAGNOSIS — R92.30 DENSE BREAST TISSUE ON MAMMOGRAM: ICD-10-CM

## 2024-05-10 DIAGNOSIS — Z12.39 BREAST CANCER SCREENING, HIGH RISK PATIENT: ICD-10-CM

## 2024-05-10 DIAGNOSIS — Z91.89 AT HIGH RISK FOR BREAST CANCER: ICD-10-CM

## 2024-05-10 PROCEDURE — A9577 INJ MULTIHANCE: HCPCS | Performed by: SURGERY

## 2024-05-10 PROCEDURE — C8908 MRI W/O FOL W/CONT, BREAST,: HCPCS

## 2024-05-10 PROCEDURE — 6360000004 HC RX CONTRAST MEDICATION: Performed by: SURGERY

## 2024-05-10 RX ADMIN — GADOBENATE DIMEGLUMINE 17 ML: 529 INJECTION, SOLUTION INTRAVENOUS at 08:25

## 2024-05-21 ENCOUNTER — PRE-PROCEDURE TELEPHONE (OUTPATIENT)
Dept: WOMENS IMAGING | Age: 59
End: 2024-05-21

## 2024-05-21 ENCOUNTER — HOSPITAL ENCOUNTER (OUTPATIENT)
Dept: WOMENS IMAGING | Age: 59
Discharge: HOME | End: 2024-05-21
Payer: COMMERCIAL

## 2024-05-21 VITALS — HEIGHT: 63 IN | WEIGHT: 186 LBS | BODY MASS INDEX: 32.96 KG/M2

## 2024-05-21 DIAGNOSIS — Z98.890 HISTORY OF BENIGN BREAST BIOPSY: ICD-10-CM

## 2024-05-21 DIAGNOSIS — Z91.89 AT HIGH RISK FOR BREAST CANCER: ICD-10-CM

## 2024-05-21 DIAGNOSIS — R92.333 HETEROGENEOUSLY DENSE TISSUE OF BOTH BREASTS ON MAMMOGRAPHY: ICD-10-CM

## 2024-05-21 PROCEDURE — G0279 TOMOSYNTHESIS, MAMMO: HCPCS

## 2024-05-21 NOTE — PROGRESS NOTES
Imaging Navigator reviewed pre-procedure stereo 2-site breast biopsy patient education information in person and gave written instructions.  Reviewed medications and need to hold all blood thinners per instructions.  None to hold  Patient should take all other medications as prescribed.  Patient to eat and drink as normal prior to the procedure.  Wear a bra with good support and a two piece outfit for comfort.  Patient can bring someone with you but you can also drive yourself.  Plan on being at the breast center for 2-2 and a half hours.  Reviewed the process of a biopsy - sitting in a chair with your breast compressed similar to a mammogram with frequent images taken throughout the procedure.  The skin is cleaned and a local anesthetic is given to numb the area.  A small skin nick is made for the biopsy needle and once in place, samples are taken and then xrayed for confirmation.  A tiny tissue marker is then placed inside your breast at the site of the biopsy for future reference.    Then pressure is hold on the biopsy site to stop the bleeding and a bandage and waterproof dressing is applied.   A mammogram is then done to validate the tissue marker.  The tissue sample is sent to pathology. Results will come back in 2-3 business days and sent to your referring physician.  Either they or the nurse navigator will call you with the results and recommended  follow up needed  Patient verbalizes understanding.

## 2024-05-22 DIAGNOSIS — R92.8 ABNORMAL FINDING ON BREAST IMAGING: Primary | ICD-10-CM

## 2024-05-30 ENCOUNTER — HOSPITAL ENCOUNTER (OUTPATIENT)
Dept: WOMENS IMAGING | Age: 59
Discharge: HOME | End: 2024-05-30
Payer: COMMERCIAL

## 2024-05-30 DIAGNOSIS — R92.8 ABNORMAL FINDING ON BREAST IMAGING: ICD-10-CM

## 2024-05-30 PROCEDURE — 2709999900 MAM STEREO BREAST BX W LOC DEVICE 1ST LESION RIGHT

## 2024-05-30 PROCEDURE — 88305 TISSUE EXAM BY PATHOLOGIST: CPT

## 2024-05-30 PROCEDURE — 2720000010 MAM STEREO BREAST BX W LOC DEVICE ADDL LESION RIGHT

## 2024-05-30 PROCEDURE — 2500000003 HC RX 250 WO HCPCS: Performed by: NUCLEAR MEDICINE

## 2024-05-30 PROCEDURE — 88342 IMHCHEM/IMCYTCHM 1ST ANTB: CPT

## 2024-05-30 PROCEDURE — 77065 DX MAMMO INCL CAD UNI: CPT

## 2024-05-30 PROCEDURE — 76098 X-RAY EXAM SURGICAL SPECIMEN: CPT

## 2024-05-30 RX ORDER — LIDOCAINE HYDROCHLORIDE AND EPINEPHRINE 10; 10 MG/ML; UG/ML
20 INJECTION, SOLUTION INFILTRATION; PERINEURAL ONCE
Status: COMPLETED | OUTPATIENT
Start: 2024-05-30 | End: 2024-05-30

## 2024-05-30 RX ORDER — LIDOCAINE HYDROCHLORIDE 10 MG/ML
5 INJECTION, SOLUTION INFILTRATION; PERINEURAL ONCE
Status: COMPLETED | OUTPATIENT
Start: 2024-05-30 | End: 2024-05-30

## 2024-05-30 RX ADMIN — LIDOCAINE HYDROCHLORIDE ANHYDROUS 3 ML: 10 INJECTION, SOLUTION INFILTRATION at 11:37

## 2024-05-30 RX ADMIN — LIDOCAINE HYDROCHLORIDE 3 ML: 10 INJECTION, SOLUTION EPIDURAL; INFILTRATION; INTRACAUDAL; PERINEURAL at 12:07

## 2024-05-30 RX ADMIN — LIDOCAINE HYDROCHLORIDE,EPINEPHRINE BITARTRATE 14 ML: 10; .01 INJECTION, SOLUTION INFILTRATION; PERINEURAL at 12:08

## 2024-05-30 RX ADMIN — LIDOCAINE HYDROCHLORIDE,EPINEPHRINE BITARTRATE 14 ML: 10; .01 INJECTION, SOLUTION INFILTRATION; PERINEURAL at 11:38

## 2024-05-30 ASSESSMENT — PAIN SCALES - GENERAL
PAINLEVEL_OUTOF10: 0
PAINLEVEL_OUTOF10: 0

## 2024-05-30 NOTE — PROGRESS NOTES
Patient here for two-site breast biopsy. iN reviewed the health history, allergies and medications. Drove herself.  Radiologist reviews procedure with patient, consent signed. Patient tolerates procedure well. Compression held. Site cleansed with chloraprep, steri strips and dry dressing applied. Ice pack in place. Reviewed discharge instructions with patient and signed copy. Patient verbalized understanding and agreed to contact Nurse Navigator with any questions. Patient A&Ox3, steady on feet and discharged home.

## 2024-05-31 ENCOUNTER — TELEPHONE (OUTPATIENT)
Dept: WOMENS IMAGING | Age: 59
End: 2024-05-31

## 2024-05-31 NOTE — TELEPHONE ENCOUNTER
Imaging Navigator reviewed results of breast biopsy which showed A. Right breast, upper outer, biopsy:        - Breast tissue with columnar cell change, stromal fibrosis, and          associated calcification.        - Negative for atypia or malignancy.        B. Right breast, 12:00, biopsy:        - Breast tissue with focal atypia, favor atypical ductal          hyperplasia- See Comment.        - Associated calcification present.  on the pathology report.  Negative for malignancy. Follow up will be reviewed once the addendum in placed.

## 2024-06-03 ENCOUNTER — TELEPHONE (OUTPATIENT)
Dept: WOMENS IMAGING | Age: 59
End: 2024-06-03

## 2024-06-03 DIAGNOSIS — Z87.42 HISTORY OF ATYPICAL HYPERPLASIA OF BREAST: ICD-10-CM

## 2024-06-03 DIAGNOSIS — R92.8 ABNORMAL MRI, BREAST: Primary | ICD-10-CM

## 2024-06-20 ENCOUNTER — HOSPITAL ENCOUNTER (OUTPATIENT)
Dept: MRI IMAGING | Age: 59
Discharge: HOME | End: 2024-06-20
Payer: COMMERCIAL

## 2024-06-20 ENCOUNTER — HOSPITAL ENCOUNTER (OUTPATIENT)
Dept: WOMENS IMAGING | Age: 59
Discharge: HOME | End: 2024-06-20
Payer: COMMERCIAL

## 2024-06-20 DIAGNOSIS — R92.8 ABNORMAL MRI, BREAST: ICD-10-CM

## 2024-06-20 DIAGNOSIS — Z87.42 HISTORY OF ATYPICAL HYPERPLASIA OF BREAST: ICD-10-CM

## 2024-06-20 DIAGNOSIS — R92.8 ABNORMAL MAGNETIC RESONANCE IMAGING OF BREAST: ICD-10-CM

## 2024-06-20 LAB
ALBUMIN SERPL-MCNC: 4.1 G/DL (ref 3.4–5)
ALBUMIN/GLOB SERPL: 1.3 {RATIO} (ref 1.1–2.2)
ALP SERPL-CCNC: 129 U/L (ref 40–129)
ALT SERPL-CCNC: 32 U/L (ref 10–40)
ANION GAP SERPL CALCULATED.3IONS-SCNC: 10 MMOL/L (ref 3–16)
AST SERPL-CCNC: 31 U/L (ref 15–37)
BILIRUB SERPL-MCNC: 0.3 MG/DL (ref 0–1)
BUN SERPL-MCNC: 13 MG/DL (ref 7–20)
CALCIUM SERPL-MCNC: 9.7 MG/DL (ref 8.3–10.6)
CHLORIDE SERPL-SCNC: 101 MMOL/L (ref 99–110)
CO2 SERPL-SCNC: 26 MMOL/L (ref 21–32)
CREAT SERPL-MCNC: 1 MG/DL (ref 0.6–1.1)
GFR SERPLBLD CREATININE-BSD FMLA CKD-EPI: 65 ML/MIN/{1.73_M2}
GLUCOSE SERPL-MCNC: 88 MG/DL (ref 70–99)
POTASSIUM SERPL-SCNC: 4.4 MMOL/L (ref 3.5–5.1)
PROT SERPL-MCNC: 7.2 G/DL (ref 6.4–8.2)
SODIUM SERPL-SCNC: 137 MMOL/L (ref 136–145)

## 2024-06-20 PROCEDURE — 77065 DX MAMMO INCL CAD UNI: CPT

## 2024-06-20 PROCEDURE — 36415 COLL VENOUS BLD VENIPUNCTURE: CPT

## 2024-06-20 PROCEDURE — 6360000004 HC RX CONTRAST MEDICATION: Performed by: SURGERY

## 2024-06-20 PROCEDURE — 2500000003 HC RX 250 WO HCPCS: Performed by: SURGERY

## 2024-06-20 PROCEDURE — A9577 INJ MULTIHANCE: HCPCS | Performed by: SURGERY

## 2024-06-20 PROCEDURE — 2720000010 MRI BIOPSY BREAST W LOC DEVICE RIGHT 1ST LESION

## 2024-06-20 PROCEDURE — 80053 COMPREHEN METABOLIC PANEL: CPT

## 2024-06-20 PROCEDURE — 19086 BX BREAST ADD LESION MR IMAG: CPT

## 2024-06-20 RX ORDER — LIDOCAINE HYDROCHLORIDE 10 MG/ML
5 INJECTION, SOLUTION EPIDURAL; INFILTRATION; INTRACAUDAL; PERINEURAL ONCE
Status: COMPLETED | OUTPATIENT
Start: 2024-06-20 | End: 2024-06-20

## 2024-06-20 RX ORDER — SODIUM CHLORIDE 0.9 % (FLUSH) 0.9 %
5-40 SYRINGE (ML) INJECTION 2 TIMES DAILY
Status: DISCONTINUED | OUTPATIENT
Start: 2024-06-20 | End: 2024-06-21 | Stop reason: HOSPADM

## 2024-06-20 RX ORDER — 0.9 % SODIUM CHLORIDE 0.9 %
500 INTRAVENOUS SOLUTION INTRAVENOUS ONCE
Status: DISCONTINUED | OUTPATIENT
Start: 2024-06-20 | End: 2024-06-21 | Stop reason: HOSPADM

## 2024-06-20 RX ORDER — LIDOCAINE HYDROCHLORIDE AND EPINEPHRINE 10; 10 MG/ML; UG/ML
40 INJECTION, SOLUTION INFILTRATION; PERINEURAL ONCE
Status: COMPLETED | OUTPATIENT
Start: 2024-06-20 | End: 2024-06-20

## 2024-06-20 RX ADMIN — LIDOCAINE HYDROCHLORIDE 5 ML: 10 INJECTION, SOLUTION EPIDURAL; INFILTRATION; INTRACAUDAL; PERINEURAL at 15:12

## 2024-06-20 RX ADMIN — GADOBENATE DIMEGLUMINE 17 ML: 529 INJECTION, SOLUTION INTRAVENOUS at 15:12

## 2024-06-20 RX ADMIN — Medication 100 ML: at 15:13

## 2024-06-20 RX ADMIN — LIDOCAINE HYDROCHLORIDE,EPINEPHRINE BITARTRATE 40 ML: 10; .01 INJECTION, SOLUTION INFILTRATION; PERINEURAL at 15:13

## 2024-06-20 NOTE — PROGRESS NOTES
Patient here for right breast MRI biopsies . NN reviewed the health history, allergies and medications.  Patient drove herself.           Radiologist reviews procedure with patient, consent signed. Patient tolerates procedure well. Compression held. Site cleansed with chloraprep, steri strips and dry dressing applied. Ice pack in place. Reviewed discharge instructions with patient and signed copy. Patient verbalized understanding and agreed to contact Nurse Navigator with any questions. Patient A&Ox3, steady on feet and discharged home.

## 2024-06-24 ENCOUNTER — TELEPHONE (OUTPATIENT)
Dept: WOMENS IMAGING | Age: 59
End: 2024-06-24

## 2024-06-24 NOTE — TELEPHONE ENCOUNTER
Nurse Navigator reviewed breast biopsy results showing A. Right breast, biopsy:        - Breast tissue with lymphohistiocytic inflammation and fibrosis.        - Negative for atypia or malignancy.        B. Right breast, upper outer, biopsy:        - Ductal carcinoma in situ, low nuclear grade- See Comment.          Biomarkers:           Estrogen receptor: Positive (>95%, strong intensity)  on the pathology report. NN explained the terminology and reviewed the results for ER test. We discussed several questions and process for establishing a care team and possible additional testing.     Patient is established with Dr. Espinosa.     NN offered additional website reading if interested.  Given ACS, [email or web address], NCCN pt, ASCO and [email or web address].  Pt/family given NN phone number for further assistance.

## 2024-07-03 NOTE — PROGRESS NOTES
[id number] STAGE: 0 right breast cancer    Multiple sites of right breast atypical ductal hyperplasia  S/p EBB, 2022       Ms. Domínguez is a 58 y.o.-year-old woman who initially presented to me with   high risk lesions and high risk for breast cancer .  Unfortunately, since her last encounter she has been diagnosed with new right-sided breast cancer.        INTERVAL HISTORY:    On 11/4/2022 she underwent multi site excisional biopsy of the right breast.  Pathology of the right breast identified no further atypia.  Biopsy site changes are identified.  There is no sign of malignancy.  [id number]     On 8/3/2023 she underwent right breast imaging.  The postoperative changes are noted in the right breast.  There are 6 subtle calcifications noted that are less concerning to the previously biopsied calcifications therefore considered likely benign.  Interval follow-up at the time of her bilateral mammogram is recommended.  BI-RADS 3.    On 11/10/2023 she underwent bilateral breast imaging.  There are stable postsurgical changes in the right breast.  There is a 2 mm group of calcifications in the upper outer right breast.  The left breast is negative.  Interval follow-up of the right breast is recommended.  BI-RADS 3.    On 5/10/2024 she underwent breast MRI.  There is a suspicious 8 mm non-mass enhancement in the upper outer right breast which is not a clear correlate for the calcifications.  An indeterminate 6 mm mass at 12:00 is also noted but potentially represents fat necrosis.  The left breast is negative.  BI-RADS 4.    On 5/21/2024 she underwent interval right breast mammography.  Indeterminate calcifications in the 6 mm position upper outer right breast and 4 mm at 12:00 are recommended for biopsy.  The calcifications at 12:00 correspond to the non-mass enhancement.  BI-RADS 4.    On 5/30/2024 she underwent 2 site right breast biopsy.  Pathology of the upper outer right breast site A demonstrates benign findings

## 2024-07-08 ENCOUNTER — INITIAL CONSULT (OUTPATIENT)
Dept: SURGERY | Age: 59
End: 2024-07-08
Payer: COMMERCIAL

## 2024-07-08 VITALS
RESPIRATION RATE: 16 BRPM | BODY MASS INDEX: 33.66 KG/M2 | HEART RATE: 70 BPM | DIASTOLIC BLOOD PRESSURE: 80 MMHG | OXYGEN SATURATION: 98 % | HEIGHT: 63 IN | SYSTOLIC BLOOD PRESSURE: 121 MMHG | WEIGHT: 190 LBS

## 2024-07-08 DIAGNOSIS — Z87.42 HISTORY OF ATYPICAL HYPERPLASIA OF BREAST: ICD-10-CM

## 2024-07-08 DIAGNOSIS — N60.91 ATYPICAL DUCTAL HYPERPLASIA OF RIGHT BREAST: ICD-10-CM

## 2024-07-08 DIAGNOSIS — C50.911 PRIMARY BREAST MALIGNANCY, RIGHT (HCC): Primary | ICD-10-CM

## 2024-07-08 PROCEDURE — 3074F SYST BP LT 130 MM HG: CPT | Performed by: SURGERY

## 2024-07-08 PROCEDURE — 3079F DIAST BP 80-89 MM HG: CPT | Performed by: SURGERY

## 2024-07-08 PROCEDURE — G9899 SCRN MAM PERF RSLTS DOC: HCPCS | Performed by: SURGERY

## 2024-07-08 PROCEDURE — 99245 OFF/OP CONSLTJ NEW/EST HI 55: CPT | Performed by: SURGERY

## 2024-07-08 PROCEDURE — G8417 CALC BMI ABV UP PARAM F/U: HCPCS | Performed by: SURGERY

## 2024-07-08 PROCEDURE — G8427 DOCREV CUR MEDS BY ELIG CLIN: HCPCS | Performed by: SURGERY

## 2024-07-08 PROCEDURE — 99417 PROLNG OP E/M EACH 15 MIN: CPT | Performed by: SURGERY

## 2024-07-08 RX ORDER — SODIUM CHLORIDE, SODIUM LACTATE, POTASSIUM CHLORIDE, CALCIUM CHLORIDE 600; 310; 30; 20 MG/100ML; MG/100ML; MG/100ML; MG/100ML
INJECTION, SOLUTION INTRAVENOUS CONTINUOUS
OUTPATIENT
Start: 2024-07-08

## 2024-07-08 RX ORDER — SODIUM CHLORIDE 0.9 % (FLUSH) 0.9 %
5-40 SYRINGE (ML) INJECTION PRN
OUTPATIENT
Start: 2024-07-08

## 2024-07-08 RX ORDER — SODIUM CHLORIDE 0.9 % (FLUSH) 0.9 %
5-40 SYRINGE (ML) INJECTION EVERY 12 HOURS SCHEDULED
OUTPATIENT
Start: 2024-07-08

## 2024-07-08 RX ORDER — SODIUM CHLORIDE 9 MG/ML
INJECTION, SOLUTION INTRAVENOUS PRN
OUTPATIENT
Start: 2024-07-08

## 2024-07-08 RX ORDER — BUPROPION HYDROCHLORIDE 150 MG/1
150 TABLET ORAL DAILY
COMMUNITY
Start: 2023-11-10

## 2024-07-09 ENCOUNTER — PREP FOR PROCEDURE (OUTPATIENT)
Dept: SURGERY | Age: 59
End: 2024-07-09

## 2024-07-09 DIAGNOSIS — N60.91 ATYPICAL DUCTAL HYPERPLASIA OF RIGHT BREAST: ICD-10-CM

## 2024-07-09 DIAGNOSIS — C50.911 PRIMARY BREAST MALIGNANCY, RIGHT (HCC): Primary | ICD-10-CM

## 2024-07-09 DIAGNOSIS — C50.911 PRIMARY BREAST MALIGNANCY, RIGHT (HCC): ICD-10-CM

## 2024-08-15 ENCOUNTER — HOSPITAL ENCOUNTER (OUTPATIENT)
Dept: WOMENS IMAGING | Age: 59
Discharge: HOME | End: 2024-08-15
Payer: COMMERCIAL

## 2024-08-15 DIAGNOSIS — N60.91 ATYPICAL DUCTAL HYPERPLASIA OF RIGHT BREAST: ICD-10-CM

## 2024-08-15 DIAGNOSIS — C50.911 PRIMARY BREAST MALIGNANCY, RIGHT (HCC): ICD-10-CM

## 2024-08-15 PROCEDURE — 19282 PERQ DEVICE BREAST EA IMAG: CPT

## 2024-08-15 PROCEDURE — 19281 PERQ DEVICE BREAST 1ST IMAG: CPT

## 2024-08-15 PROCEDURE — 2500000003 HC RX 250 WO HCPCS: Performed by: RADIOLOGY

## 2024-08-15 RX ORDER — LIDOCAINE HYDROCHLORIDE 10 MG/ML
5 INJECTION, SOLUTION INFILTRATION; PERINEURAL ONCE
Status: COMPLETED | OUTPATIENT
Start: 2024-08-15 | End: 2024-08-15

## 2024-08-15 RX ADMIN — LIDOCAINE HYDROCHLORIDE 5 ML: 10 INJECTION, SOLUTION EPIDURAL; INFILTRATION; INTRACAUDAL; PERINEURAL at 12:20

## 2024-08-15 RX ADMIN — LIDOCAINE HYDROCHLORIDE ANHYDROUS 5 ML: 10 INJECTION, SOLUTION INFILTRATION at 11:50

## 2024-08-15 ASSESSMENT — PAIN SCALES - GENERAL
PAINLEVEL_OUTOF10: 0
PAINLEVEL_OUTOF10: 0

## 2024-08-15 NOTE — PROGRESS NOTES
Patient here for right Radiofrequency tag placement x2.  . NN reviewed the health history, allergies and medications. Patient drove herself.  Radiologist reviews procedure with patient, consent signed. Patient tolerates procedure well. Compression held. Site cleansed with chloraprep, steri strips and dry dressing applied. Ice pack in place. Reviewed discharge instructions with patient and signed copy. Patient verbalized understanding and agreed to contact Nurse Navigator with any questions. Patient A&Ox3, steady on feet and discharged home.

## 2024-08-16 RX ORDER — HYDROXYCHLOROQUINE SULFATE 200 MG/1
200 TABLET, FILM COATED ORAL
COMMUNITY

## 2024-08-16 NOTE — PROGRESS NOTES
Name_______________________________________Printed:____________________  Date and time of surgery_8/20/24 @ 0815_______________________Arrival Time:_0[facility]____/per office____   1. The instructions given regarding when and if a patient needs to stop oral intake prior to surgery varies.Follow the specific instructions you were given                  __x_Nothing to eat or to drink after Midnight the night before.                   ____Carbo loading or instructions will be given to select patients-if you have been given those instructions -please do the following                           The evening before your surgery after dinner before midnight drink 40 ounces of gatorade.If you are diabetic use sugar free.  The morning of surgery drink 40 ounces of water.This needs to be finished 3 hours prior to your surgery start time.    2. Take the following pills with a small sip of water on the morning of surgery___pepcid________________________________________________                  Do not take blood pressure medications ending in pril or sartan the makayla prior to surgery or the morning of surgery. Dr Burgess's patient are not to take any medications the AM of surgery.         3. Aspirin, Ibuprofen, Advil, Naproxen, Vitamin E and other Anti-inflammatory products and supplements should be stopped for 5 -7days before surgery or as directed by your physician.   4. Check with your Doctor regarding stopping Plavix, Coumadin,Eliquis, Lovenox,Effient,Pradaxa,Xarelto, Fragmin or other blood thinners and follow their instructions.   5. Do not smoke, and do not drink any alcoholic beverages 24 hours prior to surgery.  This includes NA Beer.Refrain from the usage of any recreational drugs.   6. You may brush your teeth and gargle the morning of surgery.  DO NOT SWALLOW WATER   7. You MUST make arrangements for a responsible adult to stay on site while you are here and take you home after your surgery. You will not be allowed to leave

## 2024-08-20 ENCOUNTER — APPOINTMENT (OUTPATIENT)
Dept: WOMENS IMAGING | Age: 59
End: 2024-08-20
Attending: SURGERY
Payer: COMMERCIAL

## 2024-08-20 ENCOUNTER — HOSPITAL ENCOUNTER (OUTPATIENT)
Age: 59
Setting detail: OUTPATIENT SURGERY
Discharge: HOME | End: 2024-08-20
Attending: SURGERY | Admitting: SURGERY
Payer: COMMERCIAL

## 2024-08-20 ENCOUNTER — ANESTHESIA EVENT (OUTPATIENT)
Dept: OPERATING ROOM | Age: 59
End: 2024-08-20
Payer: COMMERCIAL

## 2024-08-20 ENCOUNTER — ANESTHESIA (OUTPATIENT)
Dept: OPERATING ROOM | Age: 59
End: 2024-08-20
Payer: COMMERCIAL

## 2024-08-20 VITALS
HEIGHT: 63 IN | BODY MASS INDEX: 33.58 KG/M2 | RESPIRATION RATE: 13 BRPM | TEMPERATURE: 97.5 F | HEART RATE: 69 BPM | DIASTOLIC BLOOD PRESSURE: 64 MMHG | OXYGEN SATURATION: 97 % | WEIGHT: 189.5 LBS | SYSTOLIC BLOOD PRESSURE: 105 MMHG

## 2024-08-20 DIAGNOSIS — N60.91 ATYPICAL DUCTAL HYPERPLASIA OF RIGHT BREAST: ICD-10-CM

## 2024-08-20 DIAGNOSIS — C50.911 PRIMARY BREAST MALIGNANCY, RIGHT (HCC): ICD-10-CM

## 2024-08-20 DIAGNOSIS — G89.18 POST-OP PAIN: Primary | ICD-10-CM

## 2024-08-20 LAB
ABO + RH BLD: NORMAL
BLD GP AB SCN SERPL QL: NORMAL

## 2024-08-20 PROCEDURE — 6360000002 HC RX W HCPCS: Performed by: SURGERY

## 2024-08-20 PROCEDURE — 76098 X-RAY EXAM SURGICAL SPECIMEN: CPT

## 2024-08-20 PROCEDURE — 14001 TIS TRNFR TRUNK 10.1-30SQCM: CPT | Performed by: SURGERY

## 2024-08-20 PROCEDURE — A4217 STERILE WATER/SALINE, 500 ML: HCPCS | Performed by: SURGERY

## 2024-08-20 PROCEDURE — 88305 TISSUE EXAM BY PATHOLOGIST: CPT

## 2024-08-20 PROCEDURE — 3700000000 HC ANESTHESIA ATTENDED CARE: Performed by: SURGERY

## 2024-08-20 PROCEDURE — 3700000001 HC ADD 15 MINUTES (ANESTHESIA): Performed by: SURGERY

## 2024-08-20 PROCEDURE — 7100000010 HC PHASE II RECOVERY - FIRST 15 MIN: Performed by: SURGERY

## 2024-08-20 PROCEDURE — 6360000002 HC RX W HCPCS: Performed by: NURSE ANESTHETIST, CERTIFIED REGISTERED

## 2024-08-20 PROCEDURE — 86901 BLOOD TYPING SEROLOGIC RH(D): CPT

## 2024-08-20 PROCEDURE — 3600000014 HC SURGERY LEVEL 4 ADDTL 15MIN: Performed by: SURGERY

## 2024-08-20 PROCEDURE — 2720000010 HC SURG SUPPLY STERILE: Performed by: SURGERY

## 2024-08-20 PROCEDURE — 6370000000 HC RX 637 (ALT 250 FOR IP): Performed by: ANESTHESIOLOGY

## 2024-08-20 PROCEDURE — 19301 PARTIAL MASTECTOMY: CPT | Performed by: SURGERY

## 2024-08-20 PROCEDURE — 86900 BLOOD TYPING SEROLOGIC ABO: CPT

## 2024-08-20 PROCEDURE — 2500000003 HC RX 250 WO HCPCS: Performed by: SURGERY

## 2024-08-20 PROCEDURE — 6360000002 HC RX W HCPCS: Performed by: ANESTHESIOLOGY

## 2024-08-20 PROCEDURE — 2500000003 HC RX 250 WO HCPCS: Performed by: NURSE ANESTHETIST, CERTIFIED REGISTERED

## 2024-08-20 PROCEDURE — 7100000001 HC PACU RECOVERY - ADDTL 15 MIN: Performed by: SURGERY

## 2024-08-20 PROCEDURE — 19125 EXCISION BREAST LESION: CPT | Performed by: SURGERY

## 2024-08-20 PROCEDURE — 88342 IMHCHEM/IMCYTCHM 1ST ANTB: CPT

## 2024-08-20 PROCEDURE — 2580000003 HC RX 258: Performed by: SURGERY

## 2024-08-20 PROCEDURE — 7100000000 HC PACU RECOVERY - FIRST 15 MIN: Performed by: SURGERY

## 2024-08-20 PROCEDURE — 88307 TISSUE EXAM BY PATHOLOGIST: CPT

## 2024-08-20 PROCEDURE — 86850 RBC ANTIBODY SCREEN: CPT

## 2024-08-20 PROCEDURE — 3600000004 HC SURGERY LEVEL 4 BASE: Performed by: SURGERY

## 2024-08-20 PROCEDURE — 2709999900 HC NON-CHARGEABLE SUPPLY: Performed by: SURGERY

## 2024-08-20 PROCEDURE — 7100000011 HC PHASE II RECOVERY - ADDTL 15 MIN: Performed by: SURGERY

## 2024-08-20 RX ORDER — SODIUM CHLORIDE 0.9 % (FLUSH) 0.9 %
5-40 SYRINGE (ML) INJECTION EVERY 12 HOURS SCHEDULED
Status: DISCONTINUED | OUTPATIENT
Start: 2024-08-20 | End: 2024-08-20 | Stop reason: HOSPADM

## 2024-08-20 RX ORDER — SODIUM CHLORIDE 9 MG/ML
INJECTION, SOLUTION INTRAVENOUS PRN
Status: DISCONTINUED | OUTPATIENT
Start: 2024-08-20 | End: 2024-08-20 | Stop reason: HOSPADM

## 2024-08-20 RX ORDER — IPRATROPIUM BROMIDE AND ALBUTEROL SULFATE 2.5; .5 MG/3ML; MG/3ML
1 SOLUTION RESPIRATORY (INHALATION)
Status: DISCONTINUED | OUTPATIENT
Start: 2024-08-20 | End: 2024-08-20 | Stop reason: HOSPADM

## 2024-08-20 RX ORDER — FENTANYL CITRATE 50 UG/ML
INJECTION, SOLUTION INTRAMUSCULAR; INTRAVENOUS PRN
Status: DISCONTINUED | OUTPATIENT
Start: 2024-08-20 | End: 2024-08-20 | Stop reason: SDUPTHER

## 2024-08-20 RX ORDER — BUPIVACAINE HYDROCHLORIDE AND EPINEPHRINE 2.5; 5 MG/ML; UG/ML
INJECTION, SOLUTION EPIDURAL; INFILTRATION; INTRACAUDAL; PERINEURAL
Status: COMPLETED | OUTPATIENT
Start: 2024-08-20 | End: 2024-08-20

## 2024-08-20 RX ORDER — ONDANSETRON 2 MG/ML
INJECTION INTRAMUSCULAR; INTRAVENOUS PRN
Status: DISCONTINUED | OUTPATIENT
Start: 2024-08-20 | End: 2024-08-20 | Stop reason: SDUPTHER

## 2024-08-20 RX ORDER — NALOXONE HYDROCHLORIDE 0.4 MG/ML
INJECTION, SOLUTION INTRAMUSCULAR; INTRAVENOUS; SUBCUTANEOUS PRN
Status: DISCONTINUED | OUTPATIENT
Start: 2024-08-20 | End: 2024-08-20 | Stop reason: HOSPADM

## 2024-08-20 RX ORDER — APREPITANT 40 MG/1
40 CAPSULE ORAL ONCE
Status: COMPLETED | OUTPATIENT
Start: 2024-08-20 | End: 2024-08-20

## 2024-08-20 RX ORDER — MAGNESIUM HYDROXIDE 1200 MG/15ML
LIQUID ORAL CONTINUOUS PRN
Status: COMPLETED | OUTPATIENT
Start: 2024-08-20 | End: 2024-08-20

## 2024-08-20 RX ORDER — MEPERIDINE HYDROCHLORIDE 25 MG/ML
12.5 INJECTION INTRAMUSCULAR; INTRAVENOUS; SUBCUTANEOUS EVERY 5 MIN PRN
Status: DISCONTINUED | OUTPATIENT
Start: 2024-08-20 | End: 2024-08-20 | Stop reason: HOSPADM

## 2024-08-20 RX ORDER — MIDAZOLAM HYDROCHLORIDE 1 MG/ML
INJECTION INTRAMUSCULAR; INTRAVENOUS PRN
Status: DISCONTINUED | OUTPATIENT
Start: 2024-08-20 | End: 2024-08-20 | Stop reason: SDUPTHER

## 2024-08-20 RX ORDER — ACETAMINOPHEN 500 MG
1000 TABLET ORAL ONCE
Status: COMPLETED | OUTPATIENT
Start: 2024-08-20 | End: 2024-08-20

## 2024-08-20 RX ORDER — HYDROMORPHONE HYDROCHLORIDE 2 MG/ML
0.5 INJECTION, SOLUTION INTRAMUSCULAR; INTRAVENOUS; SUBCUTANEOUS EVERY 5 MIN PRN
Status: DISCONTINUED | OUTPATIENT
Start: 2024-08-20 | End: 2024-08-20 | Stop reason: HOSPADM

## 2024-08-20 RX ORDER — PROCHLORPERAZINE EDISYLATE 5 MG/ML
5 INJECTION INTRAMUSCULAR; INTRAVENOUS
Status: DISCONTINUED | OUTPATIENT
Start: 2024-08-20 | End: 2024-08-20 | Stop reason: HOSPADM

## 2024-08-20 RX ORDER — HYDROCODONE BITARTRATE AND ACETAMINOPHEN 5; 325 MG/1; MG/1
1 TABLET ORAL
Qty: 30 TABLET | Refills: 0 | Status: SHIPPED | OUTPATIENT
Start: 2024-08-20 | End: 2024-08-27

## 2024-08-20 RX ORDER — SODIUM CHLORIDE 0.9 % (FLUSH) 0.9 %
5-40 SYRINGE (ML) INJECTION PRN
Status: DISCONTINUED | OUTPATIENT
Start: 2024-08-20 | End: 2024-08-20 | Stop reason: HOSPADM

## 2024-08-20 RX ORDER — SCOLOPAMINE TRANSDERMAL SYSTEM 1 MG/1
1 PATCH, EXTENDED RELEASE TRANSDERMAL
Status: DISCONTINUED | OUTPATIENT
Start: 2024-08-20 | End: 2024-08-20 | Stop reason: HOSPADM

## 2024-08-20 RX ORDER — OXYCODONE HYDROCHLORIDE 5 MG/1
10 TABLET ORAL PRN
Status: DISCONTINUED | OUTPATIENT
Start: 2024-08-20 | End: 2024-08-20 | Stop reason: HOSPADM

## 2024-08-20 RX ORDER — ONDANSETRON 2 MG/ML
4 INJECTION INTRAMUSCULAR; INTRAVENOUS
Status: DISCONTINUED | OUTPATIENT
Start: 2024-08-20 | End: 2024-08-20 | Stop reason: HOSPADM

## 2024-08-20 RX ORDER — DEXAMETHASONE SODIUM PHOSPHATE 4 MG/ML
INJECTION, SOLUTION INTRA-ARTICULAR; INTRALESIONAL; INTRAMUSCULAR; INTRAVENOUS; SOFT TISSUE PRN
Status: DISCONTINUED | OUTPATIENT
Start: 2024-08-20 | End: 2024-08-20 | Stop reason: SDUPTHER

## 2024-08-20 RX ORDER — ROCURONIUM BROMIDE 10 MG/ML
INJECTION, SOLUTION INTRAVENOUS PRN
Status: DISCONTINUED | OUTPATIENT
Start: 2024-08-20 | End: 2024-08-20 | Stop reason: SDUPTHER

## 2024-08-20 RX ORDER — HYDROMORPHONE HYDROCHLORIDE 2 MG/ML
0.25 INJECTION, SOLUTION INTRAMUSCULAR; INTRAVENOUS; SUBCUTANEOUS EVERY 5 MIN PRN
Status: DISCONTINUED | OUTPATIENT
Start: 2024-08-20 | End: 2024-08-20 | Stop reason: HOSPADM

## 2024-08-20 RX ORDER — SODIUM CHLORIDE, SODIUM LACTATE, POTASSIUM CHLORIDE, CALCIUM CHLORIDE 600; 310; 30; 20 MG/100ML; MG/100ML; MG/100ML; MG/100ML
INJECTION, SOLUTION INTRAVENOUS CONTINUOUS
Status: DISCONTINUED | OUTPATIENT
Start: 2024-08-20 | End: 2024-08-20 | Stop reason: HOSPADM

## 2024-08-20 RX ORDER — LIDOCAINE HYDROCHLORIDE 20 MG/ML
INJECTION, SOLUTION INFILTRATION; PERINEURAL PRN
Status: DISCONTINUED | OUTPATIENT
Start: 2024-08-20 | End: 2024-08-20 | Stop reason: SDUPTHER

## 2024-08-20 RX ORDER — OXYCODONE HYDROCHLORIDE 5 MG/1
5 TABLET ORAL
Status: DISCONTINUED | OUTPATIENT
Start: 2024-08-20 | End: 2024-08-20 | Stop reason: HOSPADM

## 2024-08-20 RX ORDER — PROPOFOL 10 MG/ML
INJECTION, EMULSION INTRAVENOUS PRN
Status: DISCONTINUED | OUTPATIENT
Start: 2024-08-20 | End: 2024-08-20 | Stop reason: SDUPTHER

## 2024-08-20 RX ADMIN — PROPOFOL 150 MG: 10 INJECTION, EMULSION INTRAVENOUS at 08:26

## 2024-08-20 RX ADMIN — DEXAMETHASONE SODIUM PHOSPHATE 8 MG: 4 INJECTION, SOLUTION INTRAMUSCULAR; INTRAVENOUS at 08:30

## 2024-08-20 RX ADMIN — FENTANYL CITRATE 25 MCG: 50 INJECTION, SOLUTION INTRAMUSCULAR; INTRAVENOUS at 09:40

## 2024-08-20 RX ADMIN — ONDANSETRON 4 MG: 2 INJECTION INTRAMUSCULAR; INTRAVENOUS at 09:29

## 2024-08-20 RX ADMIN — MIDAZOLAM 2 MG: 1 INJECTION INTRAMUSCULAR; INTRAVENOUS at 08:19

## 2024-08-20 RX ADMIN — APREPITANT 40 MG: 40 CAPSULE ORAL at 07:41

## 2024-08-20 RX ADMIN — SODIUM CHLORIDE, POTASSIUM CHLORIDE, SODIUM LACTATE AND CALCIUM CHLORIDE: 600; 310; 30; 20 INJECTION, SOLUTION INTRAVENOUS at 09:44

## 2024-08-20 RX ADMIN — FENTANYL CITRATE 25 MCG: 50 INJECTION, SOLUTION INTRAMUSCULAR; INTRAVENOUS at 08:45

## 2024-08-20 RX ADMIN — HYDROMORPHONE HYDROCHLORIDE 0.25 MG: 2 INJECTION, SOLUTION INTRAMUSCULAR; INTRAVENOUS; SUBCUTANEOUS at 10:09

## 2024-08-20 RX ADMIN — FENTANYL CITRATE 50 MCG: 50 INJECTION, SOLUTION INTRAMUSCULAR; INTRAVENOUS at 09:01

## 2024-08-20 RX ADMIN — SUGAMMADEX 200 MG: 100 INJECTION, SOLUTION INTRAVENOUS at 09:40

## 2024-08-20 RX ADMIN — OXYCODONE HYDROCHLORIDE 5 MG: 5 TABLET ORAL at 10:32

## 2024-08-20 RX ADMIN — ROCURONIUM BROMIDE 50 MG: 10 INJECTION, SOLUTION INTRAVENOUS at 08:26

## 2024-08-20 RX ADMIN — ACETAMINOPHEN 1000 MG: 500 TABLET ORAL at 07:42

## 2024-08-20 RX ADMIN — SODIUM CHLORIDE, POTASSIUM CHLORIDE, SODIUM LACTATE AND CALCIUM CHLORIDE: 600; 310; 30; 20 INJECTION, SOLUTION INTRAVENOUS at 07:59

## 2024-08-20 RX ADMIN — SODIUM CHLORIDE 1500 MG: 9 INJECTION, SOLUTION INTRAVENOUS at 07:59

## 2024-08-20 RX ADMIN — HYDROMORPHONE HYDROCHLORIDE 0.5 MG: 2 INJECTION, SOLUTION INTRAMUSCULAR; INTRAVENOUS; SUBCUTANEOUS at 10:04

## 2024-08-20 RX ADMIN — LIDOCAINE HYDROCHLORIDE 100 MG: 20 INJECTION, SOLUTION INFILTRATION; PERINEURAL at 08:26

## 2024-08-20 RX ADMIN — HYDROMORPHONE HYDROCHLORIDE 0.25 MG: 2 INJECTION, SOLUTION INTRAMUSCULAR; INTRAVENOUS; SUBCUTANEOUS at 10:14

## 2024-08-20 ASSESSMENT — PAIN SCALES - GENERAL
PAINLEVEL_OUTOF10: 4
PAINLEVEL_OUTOF10: 4
PAINLEVEL_OUTOF10: 0
PAINLEVEL_OUTOF10: 5
PAINLEVEL_OUTOF10: 2
PAINLEVEL_OUTOF10: 3

## 2024-08-20 ASSESSMENT — PAIN DESCRIPTION - PAIN TYPE
TYPE: SURGICAL PAIN

## 2024-08-20 ASSESSMENT — PAIN - FUNCTIONAL ASSESSMENT
PAIN_FUNCTIONAL_ASSESSMENT: 0-10
PAIN_FUNCTIONAL_ASSESSMENT: ACTIVITIES ARE NOT PREVENTED

## 2024-08-20 ASSESSMENT — PAIN DESCRIPTION - ONSET
ONSET: ON-GOING
ONSET: ON-GOING
ONSET: AWAKENED FROM SLEEP
ONSET: ON-GOING
ONSET: ON-GOING

## 2024-08-20 ASSESSMENT — PAIN DESCRIPTION - FREQUENCY
FREQUENCY: CONTINUOUS

## 2024-08-20 ASSESSMENT — PAIN DESCRIPTION - LOCATION
LOCATION: BREAST

## 2024-08-20 ASSESSMENT — PAIN DESCRIPTION - ORIENTATION
ORIENTATION: RIGHT

## 2024-08-20 ASSESSMENT — PAIN DESCRIPTION - DESCRIPTORS
DESCRIPTORS: OTHER (COMMENT)
DESCRIPTORS: DISCOMFORT
DESCRIPTORS: DISCOMFORT
DESCRIPTORS: OTHER (COMMENT)
DESCRIPTORS: OTHER (COMMENT)

## 2024-08-20 NOTE — H&P
The most recent H&P, breast imaging and documentation have been reviewed. There have been no changes. All questions have been answered. Laterality has been marked. We will proceed with a right partial mastectomy and right excisional breast biopsy.

## 2024-08-20 NOTE — ANESTHESIA POSTPROCEDURE EVALUATION
Department of Anesthesiology  Postprocedure Note    Patient: Day Domínguez  MRN: [id number]  YOB: 1965  Date of evaluation: 8/20/2024    Procedure Summary       Date: 08/20/24 Room / Location: [address]    Anesthesia Start: 0821 Anesthesia Stop: 0947    Procedure: LOCALIZED RIGHT BREAST PARTIAL MASTECTOMY; LOCALIZED RIGHT EXCISIONAL BREAST BIOPSY (Right: Breast) Diagnosis:       Primary breast malignancy, right (HCC)      Atypical ductal hyperplasia of right breast      (Primary breast malignancy, right (HCC) [C50.911])      (Atypical ductal hyperplasia of right breast [N60.91])    Surgeons: Monica Espinosa MD Responsible Provider: Sergei Bermeo MD    Anesthesia Type: general ASA Status: 2            Anesthesia Type: No value filed.    Ricardo Phase I: Ricardo Score: 10    Ricardo Phase II: Ricardo Score: 10    Anesthesia Post Evaluation    Patient location during evaluation: PACU  Patient participation: complete - patient participated  Level of consciousness: awake and alert  Airway patency: patent  Nausea & Vomiting: no nausea and no vomiting  Cardiovascular status: hemodynamically stable  Respiratory status: acceptable  Hydration status: euvolemic  Pain management: adequate    No notable events documented.

## 2024-08-20 NOTE — OP NOTE
Operative Note      Postoperative Note    Day Domínguez  YOB: 1965  [id number]    Pre-operative Diagnosis: T is Nx right breast cancer  Primary breast malignancy, right (HCC) [C50.911]  Atypical ductal hyperplasia of right breast [N60.91]    Post-operative Diagnosis: Same    Procedure:  right localizer directed partial mastectomy, right excisional breast biopsy,  right tissue rearrangement procedure for area of 20 sq. Cm.     Anesthesia: General    Surgeons/Assistants: Monica Espinosa  Assistant: RUCHI Florez    Estimated Blood Loss: less than 50     Drains: none    Complications: None apparent at conclusion of procedure    Specimens: right breast tissue (see below for details)    Findings: specimen radiograph shows capture of lesion in question    Post-Op Condition: Stable    Disposition: to recovery room    Description of Procedure:   Ms. Domínguez is a 58 y.o. woman with a clinical Tis Nx right breast cancer and atypical ductal hyperplasia. She has elected to proceed with right breast conservation therapy.  The indications for the planned procedure, along with the potential benefits and risks which include but are not limited to the risk of anesthesia, bleeding, infection, possible failed operation, possible need for additional surgery pending final pathologic assessment, lymphedema, sensation changes, and unappealing cosmetics were reviewed. All questions were answered and she agrees to proceed.    Ms. Domínguez was met by me in the preoperative area.  The surgical site was identified. Consent was obtained. The appropriate breast imaging was reviewed.    She underwent an image guided localization procedure preoperatively which was performed by the on site radiologist. The right breast lesions were again noted with the biopsy clips. The localizers are in good position. RFID tag #[id number] identifies the known malignancy and heart marker and #[id number] with top hat stephenie the

## 2024-08-20 NOTE — ANESTHESIA PRE PROCEDURE
Counseling given: Not Answered      Vital Signs (Current):   Vitals:    08/16/24 0953   Weight: 84.4 kg (186 lb)   Height: 1.6 m (5' 3\")                                              BP Readings from Last 3 Encounters:   07/08/24 121/80   08/03/23 102/68   01/05/23 99/61       NPO Status:                                                                                 BMI:   Wt Readings from Last 3 Encounters:   08/16/24 84.4 kg (186 lb)   07/08/24 86.2 kg (190 lb)   05/21/24 84.4 kg (186 lb)     Body mass index is 32.95 kg/m².    CBC:   Lab Results   Component Value Date/Time    WBC 6.8 01/20/2016 03:19 PM    RBC 5.07 01/20/2016 03:19 PM    HGB 13.3 01/20/2016 03:19 PM    HCT 42.7 01/20/2016 03:19 PM    MCV 84.2 01/20/2016 03:19 PM    RDW 16.4 01/20/2016 03:19 PM     01/20/2016 03:19 PM       CMP:   Lab Results   Component Value Date/Time     06/20/2024 02:02 PM    K 4.4 06/20/2024 02:02 PM     06/20/2024 02:02 PM    CO2 26 06/20/2024 02:02 PM    BUN 13 06/20/2024 02:02 PM    CREATININE 1.0 06/20/2024 02:02 PM    GFRAA 76 11/12/2015 03:58 PM    AGRATIO 1.3 06/20/2024 02:02 PM    LABGLOM 65 06/20/2024 02:02 PM    GLUCOSE 88 06/20/2024 02:02 PM    GLUCOSE 118 10/01/2015 03:34 PM    CALCIUM 9.7 06/20/2024 02:02 PM    BILITOT 0.3 06/20/2024 02:02 PM    ALKPHOS 129 06/20/2024 02:02 PM    AST 31 06/20/2024 02:02 PM    ALT 32 06/20/2024 02:02 PM       POC Tests: No results for input(s): \"POCGLU\", \"POCNA\", \"POCK\", \"POCCL\", \"POCBUN\", \"POCHEMO\", \"POCHCT\" in the last 72 hours.    Coags: No results found for: \"PROTIME\", \"INR\", \"APTT\"    HCG (If Applicable): No results found for: \"PREGTESTUR\", \"PREGSERUM\", \"HCG\", \"HCGQUANT\"     ABGs: No results found for: \"PHART\", \"PO2ART\", \"HLS2OON\", \"DCO5LQG\", \"BEART\", \"N5EJJCIJ\"     Type & Screen (If Applicable):  No results found for: \"LABABO\"    Drug/Infectious Status (If Applicable):  No results found for: \"HIV\", \"HEPCAB\"    COVID-19 Screening (If

## 2024-08-20 NOTE — DISCHARGE INSTRUCTIONS
responsible adult needs to be with you for 24 hours.  You may experience lightheadedness, dizziness, or sleepiness following surgery.  Rest at home today- increase activity as tolerated.  Progress slowly to a regular diet unless your physician has instructed you otherwise. Drink plenty of water.  If persistent nausea and vomiting becomes a problem, call your physician.  Coughing, sore throat and muscle aches are other side effects of anesthesia, and should improve with time.  Do not drive or operate machinery while taking narcotics.

## 2024-08-27 NOTE — PROGRESS NOTES
PCP:  Medical Oncology: Juan  Radiation: Epifanio  Other:      pTisNx  STAGE:  0 right breast cancer    Multiple sites of right breast atypical ductal hyperplasia  S/p EBB,     Ms. Armas is a 58 y.o.-year-old woman who is now s/p right partial mastectomy for right breast cancer.  She underwent this procedure on  2024  and tolerated it well.  She is doing quite well postoperatively and her pain is continuing to improve.      INTERVAL HISTORY:  On 2024 she underwent right partial mastectomy.  Pathology identified 1.8 mm of residual low-grade ER positive DCIS.  Margins were negative. [id number]     Pathology:  Department of Pathology   FINAL SURGICAL PATHOLOGY REPORT   Patient Name:  LEAH ARMAS           Accession No:  [id number]    Age Sex:   1965    58 Y / F       Location:      [facility]   Account No:    [id number]                  Collected:     2024   Med Rec No:    [id number]                 Received:      2024   Attend Phys:   KILLIAN GUTIERREZ             Completed:     2024   Perform Phys:  KILLIAN GUTIERREZ             FINAL DIAGNOSIS:     A. Right breast, lumpectomy:   - Focal residual ductal carcinoma in situ, cribriform pattern, low   nuclear grade.   - Margins not involved.   - Please see cancer protocol for additional information.     B.  Right breast tissue, central inferior, excision:   -Benign breast tissue with small intraductal papilloma and biopsy site   change.   -Negative for atypia or malignancy.     Procedure: Excision with wire-guided localization   Specimen Laterality: Right   TUMOR   + Tumor Site: Not specified   Histologic Type:  Ductal carcinoma in situ.   Size (Extent) of DCIS     Estimated size (extent) of DCIS (greatest dimension):  1.8 mm.   + Architectural Patterns: Cribriform   Nuclear Grade: Low   Necrosis: Absent   +Microcalcifications: Present     MARGINS   Margin status     All margins negative for DCIS   Distance from

## 2024-09-05 ENCOUNTER — OFFICE VISIT (OUTPATIENT)
Dept: SURGERY | Age: 59
End: 2024-09-05

## 2024-09-05 VITALS
OXYGEN SATURATION: 99 % | WEIGHT: 189 LBS | BODY MASS INDEX: 33.49 KG/M2 | SYSTOLIC BLOOD PRESSURE: 116 MMHG | HEIGHT: 63 IN | DIASTOLIC BLOOD PRESSURE: 80 MMHG | HEART RATE: 87 BPM

## 2024-09-05 DIAGNOSIS — Z09 POSTOP CHECK: Primary | ICD-10-CM

## 2024-09-05 PROCEDURE — 99024 POSTOP FOLLOW-UP VISIT: CPT | Performed by: SURGERY

## 2024-12-16 NOTE — PROGRESS NOTES
Medical Oncology: Juan  Radiation: Epifanio  Other:        pTisNx  STAGE:  0 right breast cancer     Multiple sites of right breast atypical ductal hyperplasia  S/p EBB, 2022      Ms. Domínguez is a 59 y.o.-year-old woman who initially presented to me with  right breast cancer.  Since her last encounter Ms. Domínguez has been doing quite well.  Her adjuvant treatment has included radiation and endocrine therapy.  She is noticing hand pain which is heightened by her history of rheumatoid arthritis.  She follows closely for this.  She has no new breast related concerns today.      INTERVAL HISTORY:    On 8/20/2024 she underwent right partial mastectomy.  Pathology identified 1.8 mm of residual low-grade ER positive DCIS.  Margins were negative. [id number]     From 10/3/2024 to 10/23/2024 she completed adjuvant radiation.    She was initiated on Arimidex.      Exam:  Physical exam has been reviewed and updated  General: no acute distress  Breast:  The patient was examined in the upright and supine position. There is a well healed scar on the right breast.  There are expected  post surgical and radiation related changes.  She has good range of motion with her arm.  Her contralateral breast shows no new masses or changes in breast contour.  There were no skin changes of the breast or nipple areolar complex.  There was no nipple inversion or discharge.   There is no axillary lymphadenopathy palpated bilaterally.  Respiratory: respirations are non-labored and there is no audible distress  Cardiovascular: regular rate, extremities appear well perfused  Neurologic: alert, oriented      Assessment/Plan:  pTisNx  STAGE:  0 right breast cancer  ER positive  S/p PM  S/p XRT    Taking Arimidex     Multiple sites of right breast atypical ductal hyperplasia  S/p EBB, 2022        I reviewed her physical exam findings.  There are no current signs of malignancy.    Signs/symptoms of malignancy were reviewed. She verbalizes

## 2024-12-23 ENCOUNTER — OFFICE VISIT (OUTPATIENT)
Dept: SURGERY | Age: 59
End: 2024-12-23
Payer: COMMERCIAL

## 2024-12-23 VITALS
SYSTOLIC BLOOD PRESSURE: 119 MMHG | WEIGHT: 185 LBS | DIASTOLIC BLOOD PRESSURE: 80 MMHG | HEIGHT: 63 IN | RESPIRATION RATE: 16 BRPM | HEART RATE: 78 BPM | BODY MASS INDEX: 32.78 KG/M2 | OXYGEN SATURATION: 98 %

## 2024-12-23 DIAGNOSIS — Z85.3 ENCOUNTER FOR FOLLOW-UP SURVEILLANCE OF BREAST CANCER: ICD-10-CM

## 2024-12-23 DIAGNOSIS — Z91.89 AT HIGH RISK FOR BREAST CANCER: ICD-10-CM

## 2024-12-23 DIAGNOSIS — R92.323 SCATTERED FIBROGLANDULAR TISSUE DENSITY OF BOTH BREASTS ON MAMMOGRAPHY: Primary | ICD-10-CM

## 2024-12-23 DIAGNOSIS — C50.911 PRIMARY BREAST MALIGNANCY, RIGHT (HCC): Primary | ICD-10-CM

## 2024-12-23 DIAGNOSIS — Z09 SURGICAL FOLLOW-UP CARE: ICD-10-CM

## 2024-12-23 DIAGNOSIS — Z08 ENCOUNTER FOR FOLLOW-UP SURVEILLANCE OF BREAST CANCER: ICD-10-CM

## 2024-12-23 DIAGNOSIS — Z12.39 SCREENING BREAST EXAMINATION: ICD-10-CM

## 2024-12-23 DIAGNOSIS — Z87.42 HISTORY OF ATYPICAL HYPERPLASIA OF BREAST: ICD-10-CM

## 2024-12-23 PROCEDURE — G8417 CALC BMI ABV UP PARAM F/U: HCPCS | Performed by: SURGERY

## 2024-12-23 PROCEDURE — 99214 OFFICE O/P EST MOD 30 MIN: CPT | Performed by: SURGERY

## 2024-12-23 PROCEDURE — 1036F TOBACCO NON-USER: CPT | Performed by: SURGERY

## 2024-12-23 PROCEDURE — 3074F SYST BP LT 130 MM HG: CPT | Performed by: SURGERY

## 2024-12-23 PROCEDURE — 3079F DIAST BP 80-89 MM HG: CPT | Performed by: SURGERY

## 2024-12-23 PROCEDURE — G8427 DOCREV CUR MEDS BY ELIG CLIN: HCPCS | Performed by: SURGERY

## 2024-12-23 PROCEDURE — 3017F COLORECTAL CA SCREEN DOC REV: CPT | Performed by: SURGERY

## 2024-12-23 PROCEDURE — G8484 FLU IMMUNIZE NO ADMIN: HCPCS | Performed by: SURGERY

## 2024-12-23 PROCEDURE — G9899 SCRN MAM PERF RSLTS DOC: HCPCS | Performed by: SURGERY

## 2024-12-23 RX ORDER — ANASTROZOLE 1 MG/1
TABLET ORAL
COMMUNITY
Start: 2024-10-30

## 2025-01-31 ENCOUNTER — HOSPITAL ENCOUNTER (OUTPATIENT)
Dept: WOMENS IMAGING | Age: 60
Discharge: HOME | End: 2025-01-31
Payer: COMMERCIAL

## 2025-01-31 VITALS — HEIGHT: 63 IN | WEIGHT: 180 LBS | BODY MASS INDEX: 31.89 KG/M2

## 2025-01-31 DIAGNOSIS — Z08 ENCOUNTER FOR FOLLOW-UP SURVEILLANCE OF BREAST CANCER: ICD-10-CM

## 2025-01-31 DIAGNOSIS — Z85.3 ENCOUNTER FOR FOLLOW-UP SURVEILLANCE OF BREAST CANCER: ICD-10-CM

## 2025-01-31 DIAGNOSIS — Z91.89 AT HIGH RISK FOR BREAST CANCER: ICD-10-CM

## 2025-01-31 DIAGNOSIS — R92.323 SCATTERED FIBROGLANDULAR TISSUE DENSITY OF BOTH BREASTS ON MAMMOGRAPHY: ICD-10-CM

## 2025-01-31 DIAGNOSIS — Z87.42 HISTORY OF ATYPICAL HYPERPLASIA OF BREAST: ICD-10-CM

## 2025-01-31 PROCEDURE — G0279 TOMOSYNTHESIS, MAMMO: HCPCS

## 2025-06-13 ENCOUNTER — HOSPITAL ENCOUNTER (OUTPATIENT)
Dept: MRI IMAGING | Age: 60
Discharge: HOME | End: 2025-06-13
Payer: COMMERCIAL

## 2025-06-13 DIAGNOSIS — Z08 ENCOUNTER FOR FOLLOW-UP SURVEILLANCE OF BREAST CANCER: ICD-10-CM

## 2025-06-13 DIAGNOSIS — Z87.42 HISTORY OF ATYPICAL HYPERPLASIA OF BREAST: ICD-10-CM

## 2025-06-13 DIAGNOSIS — Z85.3 ENCOUNTER FOR FOLLOW-UP SURVEILLANCE OF BREAST CANCER: ICD-10-CM

## 2025-06-13 DIAGNOSIS — R92.323 SCATTERED FIBROGLANDULAR TISSUE DENSITY OF BOTH BREASTS ON MAMMOGRAPHY: ICD-10-CM

## 2025-06-13 DIAGNOSIS — Z91.89 AT HIGH RISK FOR BREAST CANCER: ICD-10-CM

## 2025-06-13 LAB
CREAT SERPL-MCNC: 1.3 MG/DL (ref 0.6–1.1)
GFR SERPLBLD CREATININE-BSD FMLA CKD-EPI: 47 ML/MIN/{1.73_M2}

## 2025-06-13 PROCEDURE — 82565 ASSAY OF CREATININE: CPT

## 2025-06-13 PROCEDURE — 6360000004 HC RX CONTRAST MEDICATION: Performed by: SURGERY

## 2025-06-13 PROCEDURE — C8908 MRI W/O FOL W/CONT, BREAST,: HCPCS

## 2025-06-13 PROCEDURE — A9577 INJ MULTIHANCE: HCPCS | Performed by: SURGERY

## 2025-06-13 PROCEDURE — 36415 COLL VENOUS BLD VENIPUNCTURE: CPT

## 2025-06-13 RX ADMIN — GADOBENATE DIMEGLUMINE 16 ML: 529 INJECTION, SOLUTION INTRAVENOUS at 09:32

## 2025-06-18 ENCOUNTER — PRE-PROCEDURE TELEPHONE (OUTPATIENT)
Dept: WOMENS IMAGING | Age: 60
End: 2025-06-18

## 2025-06-18 ENCOUNTER — TELEPHONE (OUTPATIENT)
Dept: SURGERY | Age: 60
End: 2025-06-18

## 2025-06-18 ENCOUNTER — TELEPHONE (OUTPATIENT)
Dept: WOMENS IMAGING | Age: 60
End: 2025-06-18

## 2025-06-18 DIAGNOSIS — R92.8 ABNORMAL FINDINGS ON DIAGNOSTIC IMAGING OF BREAST: Primary | ICD-10-CM

## 2025-06-18 NOTE — TELEPHONE ENCOUNTER
Left a message to return my call. MRI bx scheduled for 7/10/25 @ 2 PM. Rescheduled appt with Dr. Espinosa to 7/21/25 @ 7324. Please verify appt will work for patient.       Thanks, Kitty

## 2025-06-18 NOTE — TELEPHONE ENCOUNTER
Nurse Navigator reviewed the education with the patient regarding an MRI biopsy:  *The technologist or a nurse may ask if you have allergies of any kind, such as an allergy to iodine or x-ray contrast material, drugs, food, or the environment, or if you have asthma. The contrast material most commonly used for an MRI exam contains a metal called gadolinium.  Please refrain from eating/drinking for four hours prior to the MRI.    Bring a written list of the medications you are taking if we haven't reviewed them.  Plan on being at the breast center for 2 -3 hours.   Wear a bra with good support (like a sports bra) and a two-piece comfortable outfit for the procedure.   You can bring someone with you but it is not required, since this is done with local anesthetic.  You may drive yourself, or bring a family member or friend, whatever is comfortable and supportive.    If you have claustrophobia (fear of enclosed spaces) or anxiety, you may want to ask your physician for a prescription for a mild sedative prior to your scheduled examination.  If this is done, please have someone drive you to the procedure.  During your biopsy:  You will be lying on your stomach for this procedure just as when you had the MRI.   If a contrast material will be used in the MRI exam, the technologist will insert an intravenous (IV) catheter, into a vein in your hand or arm.   You will be placed into the magnet of the MRI unit and the radiologist and technologist will perform the examination while working at a computer outside of the room.  If a contrast material is used during the examination, it will be injected into the intravenous line (IV) after an initial series of scans. Additional series of images will be taken during or following the injection and at different points during the procedure.  Then the skin is cleaned and a local anesthetic (slight pin prick) is given to numb the specific area which takes effect very quickly.  A small

## 2025-06-18 NOTE — TELEPHONE ENCOUNTER
Patient returned call ok with rescheduled appt., Date and time of 07/21/25 at 9:30 with Dr. Espinosa at [place].

## 2025-07-10 ENCOUNTER — HOSPITAL ENCOUNTER (OUTPATIENT)
Dept: WOMENS IMAGING | Age: 60
Discharge: HOME | End: 2025-07-10
Payer: COMMERCIAL

## 2025-07-10 ENCOUNTER — HOSPITAL ENCOUNTER (OUTPATIENT)
Dept: MRI IMAGING | Age: 60
Discharge: HOME | End: 2025-07-10
Payer: COMMERCIAL

## 2025-07-10 DIAGNOSIS — R92.8 ABNORMAL FINDINGS ON DIAGNOSTIC IMAGING OF BREAST: ICD-10-CM

## 2025-07-10 DIAGNOSIS — R92.8 ABNORMAL MAMMOGRAM: ICD-10-CM

## 2025-07-10 PROCEDURE — 6360000002 HC RX W HCPCS: Performed by: SURGERY

## 2025-07-10 PROCEDURE — 6360000004 HC RX CONTRAST MEDICATION: Performed by: SURGERY

## 2025-07-10 PROCEDURE — 77065 DX MAMMO INCL CAD UNI: CPT

## 2025-07-10 PROCEDURE — A9577 INJ MULTIHANCE: HCPCS | Performed by: SURGERY

## 2025-07-10 PROCEDURE — A4648 IMPLANTABLE TISSUE MARKER: HCPCS

## 2025-07-10 RX ORDER — LIDOCAINE HYDROCHLORIDE AND EPINEPHRINE 10; 10 MG/ML; UG/ML
20 INJECTION, SOLUTION INFILTRATION; PERINEURAL ONCE
Status: COMPLETED | OUTPATIENT
Start: 2025-07-10 | End: 2025-07-10

## 2025-07-10 RX ORDER — LIDOCAINE HYDROCHLORIDE 10 MG/ML
5 INJECTION, SOLUTION EPIDURAL; INFILTRATION; INTRACAUDAL; PERINEURAL ONCE
Status: COMPLETED | OUTPATIENT
Start: 2025-07-10 | End: 2025-07-10

## 2025-07-10 RX ADMIN — LIDOCAINE HYDROCHLORIDE,EPINEPHRINE BITARTRATE 20 ML: 10; .01 INJECTION, SOLUTION INFILTRATION; PERINEURAL at 14:41

## 2025-07-10 RX ADMIN — GADOBENATE DIMEGLUMINE 18 ML: 529 INJECTION, SOLUTION INTRAVENOUS at 14:40

## 2025-07-10 RX ADMIN — LIDOCAINE HYDROCHLORIDE 5 ML: 10 INJECTION, SOLUTION EPIDURAL; INFILTRATION; INTRACAUDAL; PERINEURAL at 14:41

## 2025-07-10 NOTE — PROGRESS NOTES
Patient here for breast biopsy. NN reviewed the health history, allergies and medications.           Pt drove herself.  Radiologist reviews procedure with patient, consent signed. Patient tolerates procedure well. Compression held. Site cleansed with chloraprep, steri strips and dry dressing applied. Ice pack in place. Reviewed discharge instructions with patient and signed copy. Patient verbalized understanding and agreed to contact Nurse Navigator with any questions. Patient A&Ox3, steady on feet and discharged home.

## 2025-07-14 NOTE — PROGRESS NOTES
Medical Oncology: Juan  Radiation: Epifanio  Other:        pTisNx  STAGE:  0 right breast cancer     Multiple sites of right breast atypical ductal hyperplasia  S/p EBB, 2022      Ms. Domínguez is a 59 y.o.-year-old woman who initially presented to me with  right breast cancer.  Since her last encounter Ms. Domínguez has been doing quite well.  Her adjuvant treatment has included radiation and endocrine therapy.  She is noticing hand pain which is heightened by her history of rheumatoid arthritis.  This improved recently with a change in her medications and injection.  She recently had a benign breast biopsy.  She has no other breast related concerns.    INTERVAL HISTORY:    On 8/20/2024 she underwent right partial mastectomy.  Pathology identified 1.8 mm of residual low-grade ER positive DCIS.  Margins were negative. [id number]     From 10/3/2024 to 10/23/2024 she completed adjuvant radiation.    She was initiated on Arimidex.    On 1/31/2025 she underwent bilateral breast imaging.  Postsurgical changes are noted in the right breast.  There were no other concerning findings suggestive of malignancy.  BI-RADS 2.    On 6/13/2025 she underwent breast MRI.  There is a suspicious enhancing focus in the posterior upper central right breast for which biopsy is recommended.  There is a 6 mm non-mass enhancement favoring fat necrosis.  BI-RADS 4.    On 7/10/2025 she underwent core needle biopsy of the right breast.  Benign inflammatory changes were noted.  This was considered concordant.  Short interval follow-up MRI was recommended in 6 months. [id number]       Exam:  Physical exam has been reviewed and updated  General: no acute distress  Breast:  The patient was examined in the upright and supine position. There is a well healed scar on the right breast.  There are expected  post surgical and radiation related changes.  She has good range of motion with her arm.  Her contralateral breast shows no new masses or

## 2025-07-15 ENCOUNTER — TELEPHONE (OUTPATIENT)
Dept: WOMENS IMAGING | Age: 60
End: 2025-07-15

## 2025-07-21 ENCOUNTER — OFFICE VISIT (OUTPATIENT)
Dept: SURGERY | Age: 60
End: 2025-07-21
Payer: COMMERCIAL

## 2025-07-21 VITALS
HEIGHT: 63 IN | OXYGEN SATURATION: 95 % | RESPIRATION RATE: 17 BRPM | WEIGHT: 182 LBS | BODY MASS INDEX: 32.25 KG/M2 | HEART RATE: 77 BPM

## 2025-07-21 DIAGNOSIS — Z98.890 HISTORY OF BENIGN BREAST BIOPSY: ICD-10-CM

## 2025-07-21 DIAGNOSIS — C50.911 PRIMARY BREAST MALIGNANCY, RIGHT (HCC): Primary | ICD-10-CM

## 2025-07-21 DIAGNOSIS — Z09 SURGICAL FOLLOW-UP CARE: ICD-10-CM

## 2025-07-21 DIAGNOSIS — Z85.3 ENCOUNTER FOR FOLLOW-UP SURVEILLANCE OF BREAST CANCER: ICD-10-CM

## 2025-07-21 DIAGNOSIS — Z12.39 SCREENING BREAST EXAMINATION: ICD-10-CM

## 2025-07-21 DIAGNOSIS — Z91.89 AT HIGH RISK FOR BREAST CANCER: ICD-10-CM

## 2025-07-21 DIAGNOSIS — Z08 ENCOUNTER FOR FOLLOW-UP SURVEILLANCE OF BREAST CANCER: ICD-10-CM

## 2025-07-21 PROCEDURE — 99214 OFFICE O/P EST MOD 30 MIN: CPT | Performed by: SURGERY

## (undated) DEVICE — STAPLER SKIN H3.9MM WIRE DIA0.58MM CRWN 6.9MM 35 STPL ROT

## (undated) DEVICE — SUTURE MONOCRYL + SZ 4-0 L27IN ABSRB UD L19MM PS-2 3/8 CIR MCP426H

## (undated) DEVICE — LIQUIBAND RAPID ADHESIVE 36/CS 0.8ML: Brand: MEDLINE

## (undated) DEVICE — Device

## (undated) DEVICE — APPLIER CLP L9.38IN M LIG TI DISP STR RNG HNDL LIGACLP

## (undated) DEVICE — BLANKET WRM W40.2XL55.9IN IORT LO BODY + MISTRAL AIR

## (undated) DEVICE — NEEDLE,22GX1.5",REG,BEVEL: Brand: MEDLINE

## (undated) DEVICE — COVER INSTRUMENT LOCALIZER

## (undated) DEVICE — SYRINGE MED 10ML TRNSLUC BRL PLUNG BLK MRK POLYPR CTRL

## (undated) DEVICE — GLOVE SURG SZ 7 L12IN THK7.5MIL DK GRN LTX FREE MSG6570] MEDLINE INDUSTRIES INC]

## (undated) DEVICE — PROBE LOCALIZER W/DRAPE

## (undated) DEVICE — SPECIMEN ORIENTATION CHARMS, SIX DISTINCTLY SHAPED STERILE 10MM CHARMS: Brand: MARGINMAP

## (undated) DEVICE — ADHESIVE SKIN CLSR 0.7ML TOP DERMBND ADV

## (undated) DEVICE — 3M™ IOBAN™ 2 ANTIMICROBIAL INCISE DRAPE 6650EZ: Brand: IOBAN™ 2

## (undated) DEVICE — PROVE COVER: Brand: UNBRANDED

## (undated) DEVICE — PENCIL SMK EVAC TELSCP 3 M TBNG

## (undated) DEVICE — MEDICINE CUP, GRADUATED, STER: Brand: MEDLINE

## (undated) DEVICE — APPLICATOR MEDICATED 26 CC SOLUTION HI LT ORNG CHLORAPREP

## (undated) DEVICE — DRAPE,CHEST,FENES,15X10,STERIL: Brand: MEDLINE

## (undated) DEVICE — PACK PROCEDURE SURG EXTREMITY MFFOP CUST

## (undated) DEVICE — TOWEL,OR,DSP,ST,BLUE,STD,4/PK,20PK/CS: Brand: MEDLINE

## (undated) DEVICE — SPONGE LAP W18XL18IN WHT COT 4 PLY FLD STRUNG RADPQ DISP ST

## (undated) DEVICE — GLOVE SURG SZ 6.5 L11.2IN FNGR THK9.8MIL STRW LTX POLYMER

## (undated) DEVICE — MASC TURNOVER KIT: Brand: MEDLINE INDUSTRIES, INC.

## (undated) DEVICE — GOWN SIRUS NONREIN XL W/TWL: Brand: MEDLINE INDUSTRIES, INC.

## (undated) DEVICE — SUTURE MONOCRYL + SZ 3-0 L27IN ABSRB UD L26MM SH 1/2 CIR MCP416H

## (undated) DEVICE — SYRINGE MED 5ML STD CLR PLAS LUERLOCK TIP N CTRL DISP

## (undated) DEVICE — SHEET,DRAPE,53X77,STERILE: Brand: MEDLINE

## (undated) DEVICE — GAUZE,SPONGE,4"X4",8PLY,STRL,LF,10/TRAY: Brand: MEDLINE

## (undated) DEVICE — SYRINGE,10ML,TR/FR,MLL,W/RES: Brand: NAMIC

## (undated) DEVICE — DEVICE SPEC PERF COMPR PLT FOR SPEC RADIOGRAPHY TRANSPEC

## (undated) DEVICE — INTENDED USE FOR SURGICAL MARKING ON INTACT SKIN, ALSO PROVIDES A PERMANENT METHOD OF IDENTIFYING OBJECTS IN THE OPERATING ROOM: Brand: WRITESITE® PLUS MINI PREP RESISTANT MARKER

## (undated) DEVICE — YANKAUER,BULB TIP,W/O VENT,RIGID,STERILE: Brand: MEDLINE

## (undated) DEVICE — Z DISCONTINUED NEEDLE HYPO 22GA L1.5IN BLK POLYPR HUB S STL REG BVL STR - SEE COMMENT

## (undated) DEVICE — SUTURE MCRYL + SZ 4-0 L27IN ABSRB UD L19MM PS-2 3/8 CIR MCP426H

## (undated) DEVICE — SUTURE VCRL + SZ 3-0 L18IN ABSRB UD SH 1/2 CIR TAPERCUT NDL VCP864D

## (undated) DEVICE — HYPODERMIC SAFETY NEEDLE: Brand: MAGELLAN

## (undated) DEVICE — SUTURE VICRYL + SZ 3-0 L18IN ABSRB UD SH 1/2 CIR TAPERCUT NDL VCP864D

## (undated) DEVICE — SOLUTION IRRIG 500ML 0.9% SOD CHLO USP POUR PLAS BTL

## (undated) DEVICE — INTENDED FOR TISSUE SEPARATION, AND OTHER PROCEDURES THAT REQUIRE A SHARP SURGICAL BLADE TO PUNCTURE OR CUT.: Brand: BARD-PARKER ® STAINLESS STEEL BLADES

## (undated) DEVICE — MAJOR SET UP PK

## (undated) DEVICE — SPONGE,PEANUT,XRAY,ST,SM,3/8",5/CARD: Brand: MEDLINE INDUSTRIES, INC.

## (undated) DEVICE — SOLUTION IRRIG 500ML 0.9% SOD CHL USP POUR PLAS BTL

## (undated) DEVICE — BLADE ES ELASTOMERIC COAT INSUL DURABLE BEND UPTO 90DEG

## (undated) DEVICE — PROBE SET W/DRAPE